# Patient Record
Sex: FEMALE | Race: WHITE | NOT HISPANIC OR LATINO | Employment: OTHER | URBAN - METROPOLITAN AREA
[De-identification: names, ages, dates, MRNs, and addresses within clinical notes are randomized per-mention and may not be internally consistent; named-entity substitution may affect disease eponyms.]

---

## 2020-11-18 RX ORDER — LEVOTHYROXINE SODIUM 175 UG/1
TABLET ORAL
Qty: 90 TABLET | Refills: 1 | Status: SHIPPED | OUTPATIENT
Start: 2020-11-18 | End: 2021-06-07 | Stop reason: DRUGHIGH

## 2020-11-18 RX ORDER — SIMVASTATIN 40 MG
TABLET ORAL
Qty: 90 TABLET | Refills: 1 | Status: SHIPPED | OUTPATIENT
Start: 2020-11-18 | End: 2022-09-22 | Stop reason: SDUPTHER

## 2020-11-18 NOTE — TELEPHONE ENCOUNTER
Patient is needing a refill on simvastatin 40mg and levothyroxine 175mg. Please send to [facility] on [address] in [place].

## 2021-06-03 ENCOUNTER — LAB (OUTPATIENT)
Dept: LAB | Facility: HOSPITAL | Age: 81
End: 2021-06-03

## 2021-06-03 ENCOUNTER — OFFICE VISIT (OUTPATIENT)
Dept: ENDOCRINOLOGY | Facility: CLINIC | Age: 81
End: 2021-06-03

## 2021-06-03 VITALS
HEIGHT: 66 IN | OXYGEN SATURATION: 99 % | BODY MASS INDEX: 26.68 KG/M2 | HEART RATE: 72 BPM | SYSTOLIC BLOOD PRESSURE: 134 MMHG | WEIGHT: 166 LBS | DIASTOLIC BLOOD PRESSURE: 74 MMHG

## 2021-06-03 DIAGNOSIS — E89.0 POSTOPERATIVE HYPOTHYROIDISM: ICD-10-CM

## 2021-06-03 DIAGNOSIS — E11.65 UNCONTROLLED TYPE 2 DIABETES MELLITUS WITH HYPERGLYCEMIA (HCC): Primary | ICD-10-CM

## 2021-06-03 DIAGNOSIS — C73 FOLLICULAR THYROID CANCER (HCC): ICD-10-CM

## 2021-06-03 DIAGNOSIS — E89.0 POSTSURGICAL HYPOTHYROIDISM: Primary | ICD-10-CM

## 2021-06-03 DIAGNOSIS — I10 BENIGN HYPERTENSION: ICD-10-CM

## 2021-06-03 LAB
EXPIRATION DATE: NORMAL
EXPIRATION DATE: NORMAL
GLUCOSE BLDC GLUCOMTR-MCNC: 124 MG/DL (ref 70–130)
HBA1C MFR BLD: 7.1 %
Lab: NORMAL
Lab: NORMAL
T4 FREE SERPL-MCNC: 1.26 NG/DL (ref 0.93–1.7)
TSH SERPL DL<=0.05 MIU/L-ACNC: 13.06 UIU/ML (ref 0.27–4.2)

## 2021-06-03 PROCEDURE — 84443 ASSAY THYROID STIM HORMONE: CPT

## 2021-06-03 PROCEDURE — 36415 COLL VENOUS BLD VENIPUNCTURE: CPT

## 2021-06-03 PROCEDURE — 86800 THYROGLOBULIN ANTIBODY: CPT

## 2021-06-03 PROCEDURE — 83036 HEMOGLOBIN GLYCOSYLATED A1C: CPT | Performed by: INTERNAL MEDICINE

## 2021-06-03 PROCEDURE — 84439 ASSAY OF FREE THYROXINE: CPT

## 2021-06-03 PROCEDURE — 99214 OFFICE O/P EST MOD 30 MIN: CPT | Performed by: INTERNAL MEDICINE

## 2021-06-03 PROCEDURE — 82947 ASSAY GLUCOSE BLOOD QUANT: CPT | Performed by: INTERNAL MEDICINE

## 2021-06-03 PROCEDURE — 84432 ASSAY OF THYROGLOBULIN: CPT

## 2021-06-03 RX ORDER — LISINOPRIL 20 MG/1
1 TABLET ORAL DAILY
COMMUNITY
Start: 2021-04-05

## 2021-06-03 RX ORDER — ASPIRIN 81 MG/1
81 TABLET ORAL DAILY
COMMUNITY

## 2021-06-03 RX ORDER — MELOXICAM 15 MG/1
1 TABLET ORAL DAILY
COMMUNITY
Start: 2021-04-05

## 2021-06-03 RX ORDER — METFORMIN HYDROCHLORIDE 500 MG/1
1000 TABLET, EXTENDED RELEASE ORAL 2 TIMES DAILY
Qty: 360 TABLET | Refills: 3 | Status: SHIPPED | OUTPATIENT
Start: 2021-06-03 | End: 2022-09-22 | Stop reason: SDUPTHER

## 2021-06-03 NOTE — PROGRESS NOTES
"     Office Note      Date: 2021  Patient Name: Katheryn Adams  MRN: [id number]  : 1940    Chief Complaint   Patient presents with   • Follow-up   • Diabetic Eye Exam     6 months ago   • Diabetes     type2    • Thyroid Problem       History of Present Illness:   Katheryn Adams is a 80 y.o. female who presents for Follow-up, Diabetic Eye Exam (6 months ago), Diabetes (type2 ), and Thyroid Problem    She remains on metformin. She is tolerating this okay. She denies any hypoglycemia. She denies any sxs of hyperglycemia. She is doing FSBS 2-3x per week. She says FSBS are <120. She is up to date on eye exam. She remains on ACE-I and statin. She isn't taking ASA daily but is on NSAIDs daily.    She remains on T4 175mcg qd. She is taking this correctly. She isn't taking any interfering meds concurrently. She hasn't noted any change in the size of her neck. She denies any compressive sxs. She denies any sxs of hypo- or hyperthyroidism at this time.    Thyroid cancer - s/p partial thyroidectomy 05 with pathology showing follicular cancer; completion thyroidectomy; 100mCi of I131 3/05; negative whole body scan and undetectable TG 3/06 and 3/07 and  and 12/10; scan negative  - but TG was 2.3 - neck u/s was negative; negative neck u/s ; negative PET scan  (for f/u breast ca); negative neck u/s 10/15; TG 2.8 in ; negative neck u/s 2017; negative neck u/s 2018 with TG of 2.5; negative neck u/s and unstimulated TG 0.9 2019    Subjective      Review of Systems:   Review of Systems   Constitutional: Negative.    Cardiovascular: Negative.    Gastrointestinal: Negative.    Endocrine: Negative.        The following portions of the patient's history were reviewed and updated as appropriate: allergies, current medications, past family history, past medical history, past social history, past surgical history and problem list.    Objective     Visit Vitals  /74   Pulse 72   Ht 167.6 cm (66\") "   Wt 75.3 kg (166 lb)   SpO2 99%   BMI 26.79 kg/m²       Physical Exam:  Physical Exam  Constitutional:       Appearance: Normal appearance.   Neurological:      Mental Status: She is alert.         Labs:    TSH  No results found for: TSHBASE     Free T4  No results found for: FREET4    T3  No results found for: [id number]      TPO  No results found for: THYROIDAB    TG AB  No results found for: THGAB    TG  No results found for: THYROGLB    CBC w/DIFF  No results found for: WBC, RBC, HGB, HCT, MCV, MCH, MCHC, RDW, RDWSD, MPV, PLT, NEUTRORELPCT, LYMPHORELPCT, MONORELPCT, EOSRELPCT, BASORELPCT, AUTOIGPER, NEUTROABS, LYMPHSABS, MONOSABS, EOSABS, BASOSABS, AUTOIGNUM, NRBC        Assessment / Plan      Assessment & Plan:  Diagnoses and all orders for this visit:    1. Uncontrolled type 2 diabetes mellitus with hyperglycemia (CMS/[facility]) (Primary)  Assessment & Plan:  Diabetes is improving with treatment.   Continue current treatment regimen.  Diabetes will be reassessed in 3 months.    Orders:  -     POC Glycosylated Hemoglobin (Hb A1C)  -     POC Glucose, Blood    2. Benign hypertension  Assessment & Plan:  Hypertension is unchanged.  Continue current treatment regimen.  Blood pressure will be reassessed at the next regular appointment.      3. Follicular thyroid cancer (CMS/HCC)  Assessment & Plan:  Check TG today.  Plan for neck u/s next visit.    Orders:  -     Thyroglobulin + Thyroglobulin Antibody (UK); Future    4. Postoperative hypothyroidism  Assessment & Plan:  Continue T4.  Check TFTs today.    Orders:  -     TSH; Future  -     T4, Free; Future    Other orders  -     metFORMIN ER (GLUCOPHAGE-XR) 500 MG 24 hr tablet; Take 2 tablets by mouth 2 (two) times a day.  Dispense: 360 tablet; Refill: 3      Return in about 3 months (around 9/3/2021) for Recheck with A1c, TSH, free T4, neck u/s.    Neri Elias MD   06/03/2021

## 2021-06-07 LAB — REF LAB TEST METHOD: NORMAL

## 2021-06-07 RX ORDER — LEVOTHYROXINE SODIUM 0.2 MG/1
200 TABLET ORAL DAILY
Qty: 90 TABLET | Refills: 3 | Status: SHIPPED | OUTPATIENT
Start: 2021-06-07 | End: 2022-05-02 | Stop reason: SDUPTHER

## 2021-10-18 ENCOUNTER — OFFICE VISIT (OUTPATIENT)
Dept: ENDOCRINOLOGY | Facility: CLINIC | Age: 81
End: 2021-10-18

## 2021-10-18 ENCOUNTER — LAB (OUTPATIENT)
Dept: LAB | Facility: HOSPITAL | Age: 81
End: 2021-10-18

## 2021-10-18 VITALS
HEIGHT: 66 IN | OXYGEN SATURATION: 96 % | BODY MASS INDEX: 25.07 KG/M2 | WEIGHT: 156 LBS | HEART RATE: 88 BPM | SYSTOLIC BLOOD PRESSURE: 148 MMHG | DIASTOLIC BLOOD PRESSURE: 72 MMHG

## 2021-10-18 DIAGNOSIS — C73 FOLLICULAR THYROID CANCER (HCC): ICD-10-CM

## 2021-10-18 DIAGNOSIS — E11.65 UNCONTROLLED TYPE 2 DIABETES MELLITUS WITH HYPERGLYCEMIA (HCC): Primary | ICD-10-CM

## 2021-10-18 DIAGNOSIS — E89.0 POSTOPERATIVE HYPOTHYROIDISM: ICD-10-CM

## 2021-10-18 LAB
EXPIRATION DATE: NORMAL
EXPIRATION DATE: NORMAL
GLUCOSE BLDC GLUCOMTR-MCNC: 120 MG/DL (ref 70–130)
HBA1C MFR BLD: 6.4 %
Lab: NORMAL
Lab: NORMAL

## 2021-10-18 PROCEDURE — 83036 HEMOGLOBIN GLYCOSYLATED A1C: CPT | Performed by: INTERNAL MEDICINE

## 2021-10-18 PROCEDURE — 3044F HG A1C LEVEL LT 7.0%: CPT | Performed by: INTERNAL MEDICINE

## 2021-10-18 PROCEDURE — 76536 US EXAM OF HEAD AND NECK: CPT | Performed by: INTERNAL MEDICINE

## 2021-10-18 PROCEDURE — 82947 ASSAY GLUCOSE BLOOD QUANT: CPT | Performed by: INTERNAL MEDICINE

## 2021-10-18 PROCEDURE — 99214 OFFICE O/P EST MOD 30 MIN: CPT | Performed by: INTERNAL MEDICINE

## 2021-10-18 PROCEDURE — 84439 ASSAY OF FREE THYROXINE: CPT

## 2021-10-18 PROCEDURE — 84443 ASSAY THYROID STIM HORMONE: CPT

## 2021-10-18 NOTE — PROGRESS NOTES
"     Office Note      Date: 10/18/2021  Patient Name: Katheryn Adams  MRN: [id number]  : 1940    Chief Complaint   Patient presents with   • Diabetes       History of Present Illness:   She remains on metformin. She is tolerating this okay. She denies any hypoglycemia. She denies any sxs of hyperglycemia. She is doing FSBS 2-3x per week. She says FSBS are <120. She is up to date on eye exam. She remains on ACE-I and statin. She isn't taking ASA daily but is on NSAIDs daily.     She remains on T4 200mcg qd. She is taking this correctly. She isn't taking any interfering meds concurrently. She hasn't noted any change in the size of her neck. She denies any compressive sxs. She denies any sxs of hypo- or hyperthyroidism at this time.     Thyroid cancer - s/p partial thyroidectomy 05 with pathology showing follicular cancer; completion thyroidectomy; 100mCi of I131 3/05; negative whole body scan and undetectable TG 3/06 and 3/07 and  and 12/10; scan negative  - but TG was 2.3 - neck u/s was negative; negative neck u/s ; negative PET scan  (for f/u breast ca); negative neck u/s 10/15; TG 2.8 in ; negative neck u/s 2017; negative neck u/s 2018 with TG of 2.5; negative neck u/s and unstimulated TG 0.9 2019; partly stimulated TG of 25 in 2021.    She has been diagnosed with breast cancer with biopsy about 2 weeks ago.  She is seeing an oncologist - formulating a treatment plan.      Subjective      Review of Systems:   Review of Systems   Constitutional: Negative.    Cardiovascular: Negative.    Gastrointestinal: Negative.    Endocrine: Negative.        The following portions of the patient's history were reviewed and updated as appropriate: allergies, current medications, past family history, past medical history, past social history, past surgical history and problem list.    Objective       Visit Vitals  /72   Pulse 88   Ht 167.6 cm (66\")   Wt 70.8 kg (156 lb)   SpO2 96% "   BMI 25.18 kg/m²       Physical Exam:  Physical Exam  Constitutional:       Appearance: Normal appearance.   Neurological:      Mental Status: She is alert.         Labs:    HbA1c  Lab Results   Component Value Date    HGBA1C 6.4 10/18/2021       CMP  No results found for: GLUCOSE, BUN, CREATININE, EGFRIFNONA, EGFRIFAFRI, BCR, K, CO2, CALCIUM, PROTENTOTREF, LABIL2, BILIRUBIN, AST, ALT     Lipid Panel        TSH  Lab Results   Component Value Date    TSH 13.060 (H) 06/03/2021    FREET4 1.26 06/03/2021        Hemoglobin A1C  Lab Results   Component Value Date    HGBA1C 6.4 10/18/2021        Microalbumin/Creatinine  No results found for: MALBCRERATIO, CREATINIURIN, MICROALBUR        Assessment / Plan      Assessment & Plan:  Diagnoses and all orders for this visit:    1. Uncontrolled type 2 diabetes mellitus with hyperglycemia (HCC) (Primary)  Assessment & Plan:  Diabetes is improving with treatment.   Continue current treatment regimen.  Diabetes will be reassessed in 3 months.    Orders:  -     POC Glycosylated Hemoglobin (Hb A1C)  -     POC Glucose, Blood    2. Postoperative hypothyroidism  Assessment & Plan:  Continue T4 tx.  Check TFTs today.    Orders:  -     TSH; Future  -     T4, Free; Future    3. Follicular thyroid cancer (HCC)  Assessment & Plan:  A neck u/s was performed today.  This revealed clear thyroid bed with no masses.  No abnormal lymph nodes were seen.      Orders:  -     US Thyroid      Return in about 3 months (around 1/18/2022) for Recheck with A1c, TSH, free T4, TG in [place].    Neri Elias MD   10/18/2021

## 2021-10-18 NOTE — ASSESSMENT & PLAN NOTE
A neck u/s was performed today.  This revealed clear thyroid bed with no masses.  No abnormal lymph nodes were seen.

## 2021-10-19 LAB
T4 FREE SERPL-MCNC: 2.62 NG/DL (ref 0.93–1.7)
TSH SERPL DL<=0.05 MIU/L-ACNC: <0.005 UIU/ML (ref 0.27–4.2)

## 2022-03-24 ENCOUNTER — OFFICE VISIT (OUTPATIENT)
Dept: ENDOCRINOLOGY | Facility: CLINIC | Age: 82
End: 2022-03-24

## 2022-03-24 VITALS
DIASTOLIC BLOOD PRESSURE: 68 MMHG | HEIGHT: 66 IN | BODY MASS INDEX: 24.43 KG/M2 | WEIGHT: 152 LBS | HEART RATE: 76 BPM | SYSTOLIC BLOOD PRESSURE: 120 MMHG | OXYGEN SATURATION: 98 %

## 2022-03-24 DIAGNOSIS — E89.0 POSTOPERATIVE HYPOTHYROIDISM: ICD-10-CM

## 2022-03-24 DIAGNOSIS — E11.65 UNCONTROLLED TYPE 2 DIABETES MELLITUS WITH HYPERGLYCEMIA: Primary | ICD-10-CM

## 2022-03-24 DIAGNOSIS — C73 FOLLICULAR THYROID CANCER: ICD-10-CM

## 2022-03-24 DIAGNOSIS — I10 BENIGN HYPERTENSION: ICD-10-CM

## 2022-03-24 PROCEDURE — 36415 COLL VENOUS BLD VENIPUNCTURE: CPT

## 2022-03-24 PROCEDURE — 86800 THYROGLOBULIN ANTIBODY: CPT | Performed by: INTERNAL MEDICINE

## 2022-03-24 PROCEDURE — 84439 ASSAY OF FREE THYROXINE: CPT | Performed by: INTERNAL MEDICINE

## 2022-03-24 PROCEDURE — 99214 OFFICE O/P EST MOD 30 MIN: CPT | Performed by: INTERNAL MEDICINE

## 2022-03-24 PROCEDURE — 84443 ASSAY THYROID STIM HORMONE: CPT | Performed by: INTERNAL MEDICINE

## 2022-03-24 PROCEDURE — 84432 ASSAY OF THYROGLOBULIN: CPT | Performed by: INTERNAL MEDICINE

## 2022-03-24 NOTE — ASSESSMENT & PLAN NOTE
Diabetes is worsening.  She reports recent A1c was 7.2%.  The office that ordered it is closed currently.  Will try to track down results.  This is an acceptable A1c for her though.  Continue current treatment regimen.  Diabetes will be reassessed in 3 months.

## 2022-03-24 NOTE — PROGRESS NOTES
Office Note      Date: 2022  Patient Name: Katheryn Adams  MRN: [id number]  : 1940    Chief Complaint   Patient presents with   • Diabetes       History of Present Illness:   She remains on metformin. She is tolerating this okay. She denies any hypoglycemia. She denies any sxs of hyperglycemia. She is doing FSBS 2-3x per week. She says FSBS are <120. She is up to date on eye exam. She remains on ACE-I and statin. She is taking ASA daily.     She remains on T4 200mcg qd. She is taking this correctly. She isn't taking any interfering meds concurrently. She hasn't noted any change in the size of her neck. She denies any compressive sxs. She denies any sxs of hypo- or hyperthyroidism at this time.     Thyroid cancer - s/p partial thyroidectomy 05 with pathology showing follicular cancer; completion thyroidectomy; 100mCi of I131 3/05; negative whole body scan and undetectable TG 3/06 and 3/07 and  and 12/10; scan negative  - but TG was 2.3 - neck u/s was negative; negative neck u/s ; negative PET scan  (for f/u breast ca); negative neck u/s 10/15; TG 2.8 in ; negative neck u/s 2017; negative neck u/s 2018 with TG of 2.5; negative neck u/s and unstimulated TG 0.9 2019; partly stimulated TG of 25 in 2021; negative neck u/s 10/2021     She has been diagnosed with breast cancer.  She is scheduled for lumpectomy next week.  She is seeing an oncologist also.     Subjective      Diabetic Complications:  Eyes: No  Kidneys: No  Feet: No  Heart: No    Diet and Exercise:  Meals per day: 3  Minutes of exercise per week: 0 mins.    Review of Systems:   Review of Systems   Constitutional: Negative.    Cardiovascular: Negative.    Gastrointestinal: Negative.    Endocrine: Negative.        The following portions of the patient's history were reviewed and updated as appropriate: allergies, current medications, past family history, past medical history, past social history, past surgical  "history and problem list.    Objective       Visit Vitals  /68   Pulse 76   Ht 167.6 cm (66\")   Wt 68.9 kg (152 lb)   SpO2 98%   BMI 24.53 kg/m²       Physical Exam:  Physical Exam  Constitutional:       Appearance: Normal appearance.   Neck:      Comments: No palpable thyroid tissue or neck masses  Lymphadenopathy:      Cervical: No cervical adenopathy.   Neurological:      Mental Status: She is alert.         Labs:    HbA1c  Lab Results   Component Value Date    HGBA1C 6.4 10/18/2021       CMP  No results found for: GLUCOSE, BUN, CREATININE, EGFRIFNONA, EGFRIFAFRI, BCR, K, CO2, CALCIUM, PROTENTOTREF, LABIL2, BILIRUBIN, AST, ALT     Lipid Panel        TSH  Lab Results   Component Value Date    TSH <0.005 (L) 10/18/2021    FREET4 2.62 (H) 10/18/2021        Hemoglobin A1C  Lab Results   Component Value Date    HGBA1C 6.4 10/18/2021        Microalbumin/Creatinine  No results found for: MALBCRERATIO, CREATINIURIN, MICROALBUR        Assessment / Plan      Assessment & Plan:  Diagnoses and all orders for this visit:    1. Uncontrolled type 2 diabetes mellitus with hyperglycemia (HCC) (Primary)  Assessment & Plan:  Diabetes is worsening.  She reports recent A1c was 7.2%.  The office that ordered it is closed currently.  Will try to track down results.  This is an acceptable A1c for her though.  Continue current treatment regimen.  Diabetes will be reassessed in 3 months.    Orders:  -     Cancel: POC Glycosylated Hemoglobin (Hb A1C)  -     Cancel: POC Glucose, Blood    2. Benign hypertension  Assessment & Plan:  Hypertension is improving with treatment.  Continue current treatment regimen.  Blood pressure will be reassessed at the next regular appointment.      3. Follicular thyroid cancer (HCC)  Assessment & Plan:  Neck u/s was negative last visit.  Check TG and TG ab today.    Orders:  -     Thyroglobulin + Thyroglobulin Antibody ([place]); Future    4. Postoperative hypothyroidism  Assessment & Plan:  Continue T4.  " Check TFTs today.    Orders:  -     TSH; Future  -     T4, Free; Future      Return in about 3 months (around 6/24/2022) for Recheck with A1c, CMP, lipids, TSH, microalbumin, foot exam.    Neri Elias MD   03/24/2022

## 2022-03-25 LAB
T4 FREE SERPL-MCNC: 2.31 NG/DL (ref 0.93–1.7)
TSH SERPL DL<=0.05 MIU/L-ACNC: 0.01 UIU/ML (ref 0.27–4.2)

## 2022-03-26 LAB — THYROGLOB AB SERPL-ACNC: <1 IU/ML (ref 0–0.9)

## 2022-03-29 LAB
SPECIMEN STATUS: NORMAL
THYROGLOB AB SERPL-ACNC: <1 IU/ML (ref 0–0.9)
THYROGLOB SERPL-MCNC: 8.3 NG/ML (ref 1.5–38.5)

## 2022-05-02 DIAGNOSIS — C73 FOLLICULAR THYROID CANCER: Primary | ICD-10-CM

## 2022-05-02 RX ORDER — LEVOTHYROXINE SODIUM 0.2 MG/1
200 TABLET ORAL DAILY
Qty: 90 TABLET | Refills: 0 | Status: SHIPPED | OUTPATIENT
Start: 2022-05-02 | End: 2022-09-22 | Stop reason: SDUPTHER

## 2022-05-02 RX ORDER — LEVOTHYROXINE SODIUM 175 UG/1
TABLET ORAL
Qty: 90 TABLET | Refills: 1 | OUTPATIENT
Start: 2022-05-02

## 2022-05-02 NOTE — TELEPHONE ENCOUNTER
Pt is on 200 mcg and Dr. Elias checked labs in March - wanted to continue dose. Refill sent while he is out of town.

## 2022-06-21 ENCOUNTER — OFFICE VISIT (OUTPATIENT)
Dept: CARDIOLOGY | Facility: CLINIC | Age: 82
End: 2022-06-21

## 2022-06-21 VITALS
BODY MASS INDEX: 23.08 KG/M2 | DIASTOLIC BLOOD PRESSURE: 92 MMHG | WEIGHT: 143.6 LBS | HEIGHT: 66 IN | SYSTOLIC BLOOD PRESSURE: 182 MMHG | HEART RATE: 64 BPM

## 2022-06-21 DIAGNOSIS — E11.9 TYPE 2 DIABETES MELLITUS WITHOUT COMPLICATION, WITHOUT LONG-TERM CURRENT USE OF INSULIN: ICD-10-CM

## 2022-06-21 DIAGNOSIS — I10 PRIMARY HYPERTENSION: ICD-10-CM

## 2022-06-21 DIAGNOSIS — E89.0 POSTOPERATIVE HYPOTHYROIDISM: ICD-10-CM

## 2022-06-21 DIAGNOSIS — E78.00 HYPERCHOLESTEREMIA: ICD-10-CM

## 2022-06-21 DIAGNOSIS — R01.1 MURMUR, CARDIAC: Primary | ICD-10-CM

## 2022-06-21 PROBLEM — I35.0 AORTIC VALVE STENOSIS: Status: ACTIVE | Noted: 2022-06-21

## 2022-06-21 PROBLEM — M25.511 RIGHT SHOULDER PAIN: Status: ACTIVE | Noted: 2022-06-21

## 2022-06-21 PROBLEM — G56.01 CARPAL TUNNEL SYNDROME ON RIGHT: Status: ACTIVE | Noted: 2022-06-21

## 2022-06-21 PROBLEM — S72.001S HIP FRACTURE, RIGHT, SEQUELA: Status: ACTIVE | Noted: 2022-06-21

## 2022-06-21 PROCEDURE — 93000 ELECTROCARDIOGRAM COMPLETE: CPT | Performed by: INTERNAL MEDICINE

## 2022-06-21 PROCEDURE — 99204 OFFICE O/P NEW MOD 45 MIN: CPT | Performed by: INTERNAL MEDICINE

## 2022-06-21 RX ORDER — CYANOCOBALAMIN (VITAMIN B-12) 500 MCG
500 TABLET ORAL DAILY
COMMUNITY

## 2022-06-21 RX ORDER — ANTIARTHRITIC COMBINATION NO.2 900 MG
1 TABLET ORAL DAILY
COMMUNITY

## 2022-06-21 RX ORDER — OMEPRAZOLE 20 MG/1
20 CAPSULE, DELAYED RELEASE ORAL DAILY
COMMUNITY

## 2022-06-21 RX ORDER — UBIDECARENONE 200 MG
200 CAPSULE ORAL DAILY
COMMUNITY

## 2022-06-21 RX ORDER — MULTIPLE VITAMINS W/ MINERALS TAB 9MG-400MCG
1 TAB ORAL DAILY
COMMUNITY

## 2022-06-21 NOTE — PROGRESS NOTES
Chief Complaint   Patient presents with   • Establish Care     Pt is here to establish care at the request of her PCP.  She states she has known she had a heart murmur for years, but has never had any issues.  She denies CP, SOB, dizziness or palpitations.  Pt's BP is elevated today- she states she forgot to take her BP meds this morning.  Pt does take a daily ASA.   • Cardiac History     Pt denies ever being evaluated by cardiology or having any cardiac testing.   • Lab Work     Pt's last labs were 22.        CARDIAC COMPLAINTS  Cardiac evaluation regarding the heart murmur      Subjective   Katheryn Adams is a 81 y.o. female came in today for her initial cardiac evaluation.  She has history of hypertension, diabetes, hypothyroidism after undergoing thyroidectomy secondary to follicular cancer.  She was seen at your office for routine checkup and found to have significant heart murmur.  She is now referred for further evaluation.  She denies having any chest pain or shortness of breath.  She denies having any dizziness or lightheadedness.  She denies having any syncopal episode.  She denies having any palpitation.  She did undergo some lab work and found to have blood sugar of 112.  Her TSH is very low at 0.009.  She has been followed by endocrinologist and wanted to keep TSH as low as possible secondary to the cancer.  Her total cholesterol is 175 with the LDL of 110.  Her blood count was within normal limit.  She has no history of smoking or drinking alcohol.  Her mother  at the age of 95 with hypertension and MI.  She does not have any siblings at this time both her brothers  at young age.  No history of premature coronary artery disease or sudden death.    Past Surgical History:   Procedure Laterality Date   • CARPAL TUNNEL RELEASE     • ORIF FEMUR FRACTURE     • SIMPLE MASTECTOMY     • THYROIDECTOMY, PARTIAL  2005       Current Outpatient Medications   Medication Sig Dispense Refill   •  aspirin 81 MG EC tablet Take 81 mg by mouth Daily.     • Biotin 5000 MCG tablet Take  by mouth.     • Coenzyme Q10 200 MG capsule Take 200 mg by mouth Daily.     • Cyanocobalamin (Vitamin B 12) 500 MCG tablet Take 500 mcg by mouth Daily.     • levothyroxine (SYNTHROID, LEVOTHROID) 200 MCG tablet Take 1 tablet by mouth Daily. 90 tablet 0   • lisinopril (PRINIVIL,ZESTRIL) 20 MG tablet 1 tablet Daily.     • meloxicam (MOBIC) 15 MG tablet 1 tablet Daily.     • metFORMIN ER (GLUCOPHAGE-XR) 500 MG 24 hr tablet Take 2 tablets by mouth 2 (two) times a day. 360 tablet 3   • multivitamin with minerals (RA VISION-DWAIN PRESERVE PO) Take 1 tablet by mouth Daily.     • omeprazole (priLOSEC) 20 MG capsule Take 20 mg by mouth Daily.     • simvastatin (Zocor) 40 MG tablet Take 1 tablet daily 90 tablet 1     No current facility-administered medications for this visit.           ALLERGIES:  Demerol [meperidine]    Past Medical History:   Diagnosis Date   • Arthritis    • Benign hypertension    • Mixed hyperlipidemia    • Postoperative hypothyroidism    • Thyroid cancer (HCC)    • Type 2 diabetes mellitus, uncontrolled        Social History     Tobacco Use   Smoking Status Never Smoker   Smokeless Tobacco Never Used          Family History   Problem Relation Age of Onset   • Hypertension Mother    • Heart attack Mother    • Arthritis Mother    • Skin cancer Mother    • Stroke Mother    • COPD Father    • Asthma Father    • Stroke Son    • Cancer Son    • Stroke Son    • Diabetes Son    • No Known Problems Son    • Diabetes Son    • Kidney failure Son    • Arthritis Son    • Pancreatitis Son        Review of Systems   Constitutional: Negative for decreased appetite and malaise/fatigue.   HENT: Negative for congestion and sore throat.    Eyes: Negative for blurred vision.   Cardiovascular: Negative for chest pain, dyspnea on exertion and palpitations.   Respiratory: Negative for shortness of breath and snoring.    Endocrine: Negative for  "cold intolerance and heat intolerance.   Hematologic/Lymphatic: Negative for adenopathy. Does not bruise/bleed easily.   Skin: Negative for itching, nail changes and skin cancer.   Musculoskeletal: Negative for arthritis and myalgias.   Gastrointestinal: Negative for abdominal pain, dysphagia and heartburn.   Genitourinary: Negative for bladder incontinence and frequency.   Neurological: Negative for dizziness, light-headedness, seizures and vertigo.   Psychiatric/Behavioral: Negative for altered mental status.   Allergic/Immunologic: Negative for environmental allergies and hives.       Diabetes- Yes  Thyroid- abnormal    Objective     BP (!) 182/92 (BP Location: Right arm, Patient Position: Sitting)   Pulse 64   Ht 167.6 cm (66\")   Wt 65.1 kg (143 lb 9.6 oz)   BMI 23.18 kg/m²     Vitals and nursing note reviewed.   Constitutional:       Appearance: Healthy appearance. Not in distress.   Eyes:      Conjunctiva/sclera: Conjunctivae normal.      Pupils: Pupils are equal, round, and reactive to light.   HENT:      Head: Normocephalic.   Pulmonary:      Effort: Pulmonary effort is normal.      Breath sounds: Normal breath sounds.   Cardiovascular:      PMI at left midclavicular line. Normal rate. Regular rhythm.      Murmurs: There is a grade 4/6 harsh midsystolic murmur at the URSB, radiating to the neck. There is also a grade 3/6 high frequency blowing holosystolic murmur at the apex.   Abdominal:      General: Bowel sounds are normal.      Palpations: Abdomen is soft.   Musculoskeletal: Normal range of motion.      Cervical back: Normal range of motion and neck supple. Skin:     General: Skin is warm and dry.   Neurological:      Mental Status: Alert, oriented to person, place, and time and oriented to person, place and time.           ECG 12 Lead    Date/Time: 6/21/2022 1:40 PM  Performed by: Paco Campos MD  Authorized by: Paco Campos MD   Previous ECG: no previous ECG available  Rhythm: sinus " rhythm  Rate: normal  Q waves: V1 and V2    QRS axis: normal    Clinical impression: abnormal EKG              @ASSESSMENT/PLAN@  BMI is within normal parameters. No other follow-up for BMI required.     Diagnoses and all orders for this visit:    1. Murmur, cardiac (Primary)  -     Adult Transthoracic Echo Complete W/ Cont if Necessary Per Protocol; Future    2. Postoperative hypothyroidism    3. Type 2 diabetes mellitus without complication, without long-term current use of insulin ([facility])    4. Primary hypertension    5. Hypercholesteremia    At baseline her heart rate is stable.  Her blood pressure is significantly elevated.  She has not taken her blood pressure medications today.  Her EKG shows normal sinus rhythm, Q waves in the anteroseptal leads, nonspecific ST changes.  Her clinical examination reveals a BMI of 33.  She does have 4/6 ejection systolic murmur at the aortic area now 3/6 systolic murmur at the mitral area.    Regarding the cardiac murmur, it appears to be secondary to aortic valvular stenosis and possible associated mitral regurgitation.  I explained to her about the murmur.  I scheduled her to undergo an echocardiogram to evaluate the LV function, LVH, valvular structures and PA pressure.  I also explained to her about the aortic stenosis and the main symptoms we look for.  I also discussed with her about the different options to correct it when needed.  I advised her to have echocardiogram done periodically for the next few years.  She is also advised to call us if she develops any symptoms of increasing shortness of breath, dizziness or chest pain.    Regarding her hypothyroidism which is postoperative, she is on a high dose of Synthroid to and the TSH is very low.  This is managed by the endocrinologist.  I advised her to stop biotin for few days prior to checking her next TSH level.    Regarding her diabetes, she is on metformin.  She is able to control her blood sugar with low-dose of  medication and diet.    Regarding her hypertension, it is significantly elevated which could be related to not taking the medication.  I explained to her the importance of taking the medications regularly    Regarding her hypercholesterolemia it is still slightly elevated with Zocor at 40 mg.  Since he is not having any anginal symptoms we will continue to monitor it    Regarding advanced directive, talked to her about living will and power of .  She does have a power of  but no living will.  I talked to her about it and gave her booklets regarding that    Based on the echocardiogram, further recommendations will be made.             Electronically signed by Paco Campos MD June 21, 2022 13:32 EDT

## 2022-07-14 ENCOUNTER — HOSPITAL ENCOUNTER (OUTPATIENT)
Dept: CARDIOLOGY | Facility: HOSPITAL | Age: 82
Discharge: HOME | End: 2022-07-14
Admitting: INTERNAL MEDICINE

## 2022-07-14 VITALS — HEIGHT: 66 IN | BODY MASS INDEX: 23.07 KG/M2 | WEIGHT: 143.52 LBS

## 2022-07-14 DIAGNOSIS — R01.1 MURMUR, CARDIAC: ICD-10-CM

## 2022-07-14 PROCEDURE — 93306 TTE W/DOPPLER COMPLETE: CPT

## 2022-07-14 PROCEDURE — 93306 TTE W/DOPPLER COMPLETE: CPT | Performed by: INTERNAL MEDICINE

## 2022-07-15 LAB
AORTIC DIMENSIONLESS INDEX: 0.32 (DI)
BH CV ECHO MEAS - ACS: 0.73 CM
BH CV ECHO MEAS - AO MAX PG: 51.1 MMHG
BH CV ECHO MEAS - AO MEAN PG: 27 MMHG
BH CV ECHO MEAS - AO ROOT DIAM: 3.2 CM
BH CV ECHO MEAS - AO V2 MAX: 357.4 CM/SEC
BH CV ECHO MEAS - AO V2 VTI: 90.6 CM
BH CV ECHO MEAS - AVA(I,D): 1.15 CM2
BH CV ECHO MEAS - EDV(CUBED): 67.9 ML
BH CV ECHO MEAS - EF(MOD-BP): 51 %
BH CV ECHO MEAS - ESV(CUBED): 27.9 ML
BH CV ECHO MEAS - FS: 25.7 %
BH CV ECHO MEAS - IVS/LVPW: 1.06 CM
BH CV ECHO MEAS - IVSD: 1.05 CM
BH CV ECHO MEAS - LA DIMENSION: 4.1 CM
BH CV ECHO MEAS - LAT PEAK E' VEL: 7.9 CM/SEC
BH CV ECHO MEAS - LV MASS(C)D: 135.5 GRAMS
BH CV ECHO MEAS - LV MAX PG: 5.1 MMHG
BH CV ECHO MEAS - LV MEAN PG: 2.5 MMHG
BH CV ECHO MEAS - LV V1 MAX: 113.4 CM/SEC
BH CV ECHO MEAS - LV V1 VTI: 34.3 CM
BH CV ECHO MEAS - LVIDD: 4.1 CM
BH CV ECHO MEAS - LVIDS: 3 CM
BH CV ECHO MEAS - LVOT AREA: 3 CM2
BH CV ECHO MEAS - LVOT DIAM: 1.97 CM
BH CV ECHO MEAS - LVPWD: 0.99 CM
BH CV ECHO MEAS - MED PEAK E' VEL: 5.9 CM/SEC
BH CV ECHO MEAS - MV A MAX VEL: 106.1 CM/SEC
BH CV ECHO MEAS - MV DEC SLOPE: 297.5 CM/SEC2
BH CV ECHO MEAS - MV DEC TIME: 0.32 MSEC
BH CV ECHO MEAS - MV E MAX VEL: 80.8 CM/SEC
BH CV ECHO MEAS - MV E/A: 0.76
BH CV ECHO MEAS - MV MAX PG: 6.8 MMHG
BH CV ECHO MEAS - MV MEAN PG: 2.28 MMHG
BH CV ECHO MEAS - MV P1/2T: 99.9 MSEC
BH CV ECHO MEAS - MV V2 VTI: 40.7 CM
BH CV ECHO MEAS - MVA(P1/2T): 2.2 CM2
BH CV ECHO MEAS - MVA(VTI): 2.6 CM2
BH CV ECHO MEAS - PA V2 MAX: 126.5 CM/SEC
BH CV ECHO MEAS - RAP SYSTOLE: 10 MMHG
BH CV ECHO MEAS - RV MAX PG: 4.4 MMHG
BH CV ECHO MEAS - RV V1 MAX: 104.9 CM/SEC
BH CV ECHO MEAS - RV V1 VTI: 28.5 CM
BH CV ECHO MEAS - RVDD: 3 CM
BH CV ECHO MEAS - RVSP: 26.8 MMHG
BH CV ECHO MEAS - SV(LVOT): 104.4 ML
BH CV ECHO MEAS - TAPSE (>1.6): 2.09 CM
BH CV ECHO MEAS - TR MAX PG: 16.8 MMHG
BH CV ECHO MEAS - TR MAX VEL: 204.8 CM/SEC
BH CV ECHO MEASUREMENTS AVERAGE E/E' RATIO: 11.71
BH CV XLRA - TDI S': 11.1 CM/SEC
IVRT: 140 MSEC
MAXIMAL PREDICTED HEART RATE: 137 BPM
SINUS: 2.6 CM
STRESS TARGET HR: 116 BPM

## 2022-07-18 NOTE — PROGRESS NOTES
Pt aware of echo results and recommendations that she will be closely monitored, to call with any problems. Understanding voiced.

## 2022-09-22 ENCOUNTER — OFFICE VISIT (OUTPATIENT)
Dept: ENDOCRINOLOGY | Facility: CLINIC | Age: 82
End: 2022-09-22

## 2022-09-22 VITALS
DIASTOLIC BLOOD PRESSURE: 60 MMHG | HEART RATE: 88 BPM | WEIGHT: 133 LBS | BODY MASS INDEX: 21.38 KG/M2 | SYSTOLIC BLOOD PRESSURE: 128 MMHG | OXYGEN SATURATION: 99 % | HEIGHT: 66 IN

## 2022-09-22 DIAGNOSIS — E89.0 POSTOPERATIVE HYPOTHYROIDISM: ICD-10-CM

## 2022-09-22 DIAGNOSIS — I10 PRIMARY HYPERTENSION: ICD-10-CM

## 2022-09-22 DIAGNOSIS — E11.65 UNCONTROLLED TYPE 2 DIABETES MELLITUS WITH HYPERGLYCEMIA: Primary | ICD-10-CM

## 2022-09-22 DIAGNOSIS — C73 FOLLICULAR THYROID CANCER: ICD-10-CM

## 2022-09-22 DIAGNOSIS — E11.65 UNCONTROLLED TYPE 2 DIABETES MELLITUS WITH HYPERGLYCEMIA: ICD-10-CM

## 2022-09-22 DIAGNOSIS — E78.00 HYPERCHOLESTEREMIA: ICD-10-CM

## 2022-09-22 LAB
ALBUMIN SERPL-MCNC: 4.71 G/DL (ref 3.5–5.2)
ALBUMIN/GLOB SERPL: 1.6 G/DL
ALP SERPL-CCNC: 75 U/L (ref 39–117)
ALT SERPL W P-5'-P-CCNC: 7 U/L (ref 1–33)
ANION GAP SERPL CALCULATED.3IONS-SCNC: 12.5 MMOL/L (ref 5–15)
AST SERPL-CCNC: 15 U/L (ref 1–32)
BILIRUB SERPL-MCNC: 0.2 MG/DL (ref 0–1.2)
BUN SERPL-MCNC: 21 MG/DL (ref 8–23)
BUN/CREAT SERPL: 21.2 (ref 7–25)
CALCIUM SPEC-SCNC: 10.3 MG/DL (ref 8.6–10.5)
CHLORIDE SERPL-SCNC: 103 MMOL/L (ref 98–107)
CHOLEST SERPL-MCNC: 185 MG/DL (ref 0–200)
CO2 SERPL-SCNC: 23.5 MMOL/L (ref 22–29)
CREAT SERPL-MCNC: 0.99 MG/DL (ref 0.57–1)
EGFRCR SERPLBLD CKD-EPI 2021: 56.3 ML/MIN/1.73
EXPIRATION DATE: NORMAL
EXPIRATION DATE: NORMAL
GLOBULIN UR ELPH-MCNC: 2.9 GM/DL
GLUCOSE BLDC GLUCOMTR-MCNC: 125 MG/DL (ref 70–130)
GLUCOSE SERPL-MCNC: 110 MG/DL (ref 65–99)
HBA1C MFR BLD: 5.8 %
HDLC SERPL-MCNC: 55 MG/DL (ref 40–60)
LDLC SERPL CALC-MCNC: 112 MG/DL (ref 0–100)
LDLC/HDLC SERPL: 1.99 {RATIO}
Lab: NORMAL
Lab: NORMAL
POTASSIUM SERPL-SCNC: 4.6 MMOL/L (ref 3.5–5.2)
PROT SERPL-MCNC: 7.6 G/DL (ref 6–8.5)
SODIUM SERPL-SCNC: 139 MMOL/L (ref 136–145)
T4 FREE SERPL-MCNC: 1.47 NG/DL (ref 0.93–1.7)
TRIGL SERPL-MCNC: 102 MG/DL (ref 0–150)
TSH SERPL DL<=0.05 MIU/L-ACNC: 0.05 UIU/ML (ref 0.27–4.2)
VLDLC SERPL-MCNC: 18 MG/DL (ref 5–40)

## 2022-09-22 PROCEDURE — 82947 ASSAY GLUCOSE BLOOD QUANT: CPT | Performed by: INTERNAL MEDICINE

## 2022-09-22 PROCEDURE — 80053 COMPREHEN METABOLIC PANEL: CPT | Performed by: INTERNAL MEDICINE

## 2022-09-22 PROCEDURE — 82043 UR ALBUMIN QUANTITATIVE: CPT | Performed by: INTERNAL MEDICINE

## 2022-09-22 PROCEDURE — 3044F HG A1C LEVEL LT 7.0%: CPT | Performed by: INTERNAL MEDICINE

## 2022-09-22 PROCEDURE — 99214 OFFICE O/P EST MOD 30 MIN: CPT | Performed by: INTERNAL MEDICINE

## 2022-09-22 PROCEDURE — 82570 ASSAY OF URINE CREATININE: CPT | Performed by: INTERNAL MEDICINE

## 2022-09-22 PROCEDURE — 83036 HEMOGLOBIN GLYCOSYLATED A1C: CPT | Performed by: INTERNAL MEDICINE

## 2022-09-22 PROCEDURE — 80061 LIPID PANEL: CPT | Performed by: INTERNAL MEDICINE

## 2022-09-22 PROCEDURE — 84443 ASSAY THYROID STIM HORMONE: CPT | Performed by: INTERNAL MEDICINE

## 2022-09-22 PROCEDURE — 84439 ASSAY OF FREE THYROXINE: CPT | Performed by: INTERNAL MEDICINE

## 2022-09-22 RX ORDER — METFORMIN HYDROCHLORIDE 500 MG/1
1000 TABLET, EXTENDED RELEASE ORAL 2 TIMES DAILY
Qty: 360 TABLET | Refills: 3 | Status: SHIPPED | OUTPATIENT
Start: 2022-09-22

## 2022-09-22 RX ORDER — LEVOTHYROXINE SODIUM 0.2 MG/1
200 TABLET ORAL DAILY
Qty: 90 TABLET | Refills: 3 | Status: SHIPPED | OUTPATIENT
Start: 2022-09-22

## 2022-09-22 RX ORDER — SIMVASTATIN 40 MG
TABLET ORAL
Qty: 90 TABLET | Refills: 3 | Status: SHIPPED | OUTPATIENT
Start: 2022-09-22

## 2022-09-22 NOTE — PROGRESS NOTES
Office Note      Date: 2022  Patient Name: Katheryn Adams  MRN: [id number]  : 1940    Chief Complaint   Patient presents with   • Diabetes       History of Present Illness:   She remains on metformin. She is tolerating this okay. She denies any hypoglycemia. She denies any sxs of hyperglycemia. She is doing FSBS 2-3x per week. She says FSBS have been . She is up to date on eye exam. She remains on ACE-I and statin. She is taking ASA daily.     She remains on T4 200mcg qd. She is taking this correctly. She isn't taking any interfering meds concurrently. She hasn't noted any change in the size of her neck. She denies any compressive sxs. She denies any sxs of hypo- or hyperthyroidism at this time.     Thyroid cancer - s/p partial thyroidectomy 05 with pathology showing follicular cancer; completion thyroidectomy; 100mCi of I131 3/05; negative whole body scan and undetectable TG 3/06 and 3/07 and  and 12/10; scan negative  - but TG was 2.3 - neck u/s was negative; negative neck u/s ; negative PET scan  (for f/u breast ca); negative neck u/s 10/15; TG 2.8 in ; negative neck u/s 2017; negative neck u/s 2018 with TG of 2.5; negative neck u/s and unstimulated TG 0.9 2019; partly stimulated TG of 25 in 2021; negative neck u/s 10/2021; TG 8.3 with negative TG ab 3/2022     She has been diagnosed with breast cancer.  She had lumpectomy.  She will need further surgery.  She was told it wasn't an aggressive type.  No chemotx or XRT.    Subjective      Diabetic Complications:  Eyes: No  Kidneys: No  Feet: No  Heart: No    Diet and Exercise:  Meals per day: 3  Minutes of exercise per week: 0 mins.    Review of Systems:   Review of Systems   Constitutional: Negative.    Cardiovascular: Negative.    Gastrointestinal: Negative.    Endocrine: Negative.        The following portions of the patient's history were reviewed and updated as appropriate: allergies, current  "medications, past family history, past medical history, past social history, past surgical history and problem list.    Objective       Visit Vitals  /60   Pulse 88   Ht 167.6 cm (66\")   Wt 60.3 kg (133 lb)   SpO2 99%   BMI 21.47 kg/m²       Physical Exam:  Physical Exam  Constitutional:       Appearance: Normal appearance.   Neck:      Comments: No palpable thyroid tissue or neck masses  Lymphadenopathy:      Cervical: No cervical adenopathy.   Neurological:      Mental Status: She is alert.         Labs:    HbA1c  Lab Results   Component Value Date    HGBA1C 5.8 09/22/2022       CMP  No results found for: GLUCOSE, BUN, CREATININE, EGFRIFNONA, EGFRIFAFRI, BCR, K, CO2, CALCIUM, PROTENTOTREF, LABIL2, BILIRUBIN, AST, ALT     Lipid Panel        TSH  Lab Results   Component Value Date    TSH 0.006 (L) 03/24/2022    FREET4 2.31 (H) 03/24/2022        Hemoglobin A1C  Lab Results   Component Value Date    HGBA1C 5.8 09/22/2022        Microalbumin/Creatinine  No results found for: MALBCRERATIO, CREATINIURIN, MICROALBUR        Assessment / Plan      Assessment & Plan:  Diagnoses and all orders for this visit:    1. Uncontrolled type 2 diabetes mellitus with hyperglycemia (HCC) (Primary)  Assessment & Plan:  Diabetes is unchanged.   Continue current treatment regimen.  Diabetes will be reassessed in 3 months.    Orders:  -     POC Glycosylated Hemoglobin (Hb A1C)  -     POC Glucose, Blood  -     Comprehensive Metabolic Panel; Future  -     Lipid Panel; Future  -     Microalbumin / Creatinine Urine Ratio - Urine, Clean Catch; Future    2. Primary hypertension  Assessment & Plan:  Hypertension is improving with treatment.  Continue current treatment regimen.  Blood pressure will be reassessed at the next regular appointment.      3. Hypercholesteremia  Assessment & Plan:  Continue statin.  Check lipids today.      4. Postoperative hypothyroidism  Assessment & Plan:  Continue T4.  Check TSH, free T4.    Orders:  -     TSH; " Future  -     T4, Free; Future    5. Follicular thyroid cancer (HCC)  Assessment & Plan:  TG has been elevated.  No structural abnormalities noted.  Plan for another neck u/s next visit.    Orders:  -     levothyroxine (SYNTHROID, LEVOTHROID) 200 MCG tablet; Take 1 tablet by mouth Daily.  Dispense: 90 tablet; Refill: 3    Other orders  -     metFORMIN ER (GLUCOPHAGE-XR) 500 MG 24 hr tablet; Take 2 tablets by mouth 2 (Two) Times a Day.  Dispense: 360 tablet; Refill: 3  -     simvastatin (Zocor) 40 MG tablet; Take 1 tablet daily  Dispense: 90 tablet; Refill: 3      Return in about 3 months (around 12/22/2022) for Recheck with A1c, TSH, free T4, neck u/s.    Neri Elias MD   09/22/2022

## 2022-09-23 LAB
ALBUMIN UR-MCNC: <1.2 MG/DL
CREAT UR-MCNC: 18.5 MG/DL
MICROALBUMIN/CREAT UR: NORMAL MG/G{CREAT}

## 2022-10-24 DIAGNOSIS — C73 FOLLICULAR THYROID CANCER: ICD-10-CM

## 2022-10-24 RX ORDER — LEVOTHYROXINE SODIUM 0.2 MG/1
TABLET ORAL
Qty: 90 TABLET | Refills: 3 | OUTPATIENT
Start: 2022-10-24

## 2022-10-24 NOTE — TELEPHONE ENCOUNTER
Duplicate request RX E-scripted for 90 days with 3 refills   levothyroxine (SYNTHROID, LEVOTHROID) 200 MCG tablet [[id number]]     Order Details  Dose: 200 mcg Route: Oral Frequency: Daily   Dispense Quantity: 90 tablet Refills: 3          Sig: Take 1 tablet by mouth Daily.         Start Date: 09/22/22 End Date: --   Written Date: 09/22/22 Expiration Date: 09/22/23       Diagnosis Association: Follicular thyroid cancer (HCC) (C73)   Original Order:  levothyroxine (SYNTHROID, LEVOTHROID) 200 MCG tablet [[id number]]   Providers    Ordering Provider and Authorizing Provider:    Neri Elias MD   [facility] [address]   Phone:  [phone]   Fax:  [phone]   NPI:  [id number]        Ordering User:  Neri Elias MD          Pharmacy    [facility] [id number] [phone] [facility] [phone] [address] [address]   Phone:  [phone]  Fax:  [phone]     Lab Test Results    Component Date/Time Value Range & Unit Status   TSH 09/22/22 1509 0.051 (L) 0.270 - 4.200 uIU/mL Final     Warnings Override History    No Interaction Warnings Shown      Pharmacist Clinical Review History    This prescription has not been clinically reviewed.     Order Reconciliation Actions       Order Reconciliation Actions     E-Prescribing Status    Outpatient Medication Detail    levothyroxine (SYNTHROID, LEVOTHROID) 200 MCG tablet        Sig: Take 1 tablet by mouth Daily.        Sent to pharmacy as: Levothyroxine Sodium 200 MCG Oral Tablet (SYNTHROID, LEVOTHROID)        Class: Normal        Route: Oral        E-Prescribing Status: Receipt confirmed by pharmacy (9/22/2022  2:45 PM EDT)

## 2023-01-03 ENCOUNTER — OFFICE VISIT (OUTPATIENT)
Dept: CARDIOLOGY | Facility: CLINIC | Age: 83
End: 2023-01-03
Payer: MEDICARE

## 2023-01-03 VITALS
HEIGHT: 66 IN | DIASTOLIC BLOOD PRESSURE: 70 MMHG | HEART RATE: 86 BPM | WEIGHT: 126 LBS | SYSTOLIC BLOOD PRESSURE: 130 MMHG | BODY MASS INDEX: 20.25 KG/M2

## 2023-01-03 DIAGNOSIS — I10 PRIMARY HYPERTENSION: ICD-10-CM

## 2023-01-03 DIAGNOSIS — R01.1 MURMUR, CARDIAC: ICD-10-CM

## 2023-01-03 DIAGNOSIS — E11.9 TYPE 2 DIABETES MELLITUS WITHOUT COMPLICATION, WITHOUT LONG-TERM CURRENT USE OF INSULIN: ICD-10-CM

## 2023-01-03 DIAGNOSIS — I35.0 NONRHEUMATIC AORTIC VALVE STENOSIS: Primary | ICD-10-CM

## 2023-01-03 PROCEDURE — 1160F RVW MEDS BY RX/DR IN RCRD: CPT | Performed by: NURSE PRACTITIONER

## 2023-01-03 PROCEDURE — 99213 OFFICE O/P EST LOW 20 MIN: CPT | Performed by: NURSE PRACTITIONER

## 2023-01-03 PROCEDURE — 1159F MED LIST DOCD IN RCRD: CPT | Performed by: NURSE PRACTITIONER

## 2023-01-03 NOTE — LETTER
January 3, 2023     Jasen Mendoza MD  [address] [place] [address]    Patient: Katheryn Adams   YOB: 1940   Date of Visit: 1/3/2023       Dear Jasen Mendoza MD    Katheryn Adams was in my office today. Below is a copy of my note.    If you have questions, please do not hesitate to call me. I look forward to following Katheryn along with you.         Sincerely,        SCOTTIE Reynolds        CC: No Recipients    Chief Complaint   Patient presents with   • Follow-up     Pt is here for cardiac follow up.  Pt denies CP, SOB, dizziness or palpitations.  Pt does take a daily ASA.   • Med Refill     Refills with PCP and specialty   • Lab Work     Pt states their last labs were in September with Dr Elias.         Cardiac Complaints  none      Subjective    Katheryn Adams is a 82 y.o. female with AS, DM, HTN, and hypothyroidism after thyroidectomy from thyroid cancer. She was referred in 2022 for cardiac evaluation. Echo ordered at consult showed EF of 55% with BOGDAN of 1.12, annual echo recommended, or sooner if symptoms arose.    Patient comes today for follow up and reports doing well. No CP, SOA, palpitations, dizziness, or syncope are noted. Labs remain followed by PCP and specialty. No refills needed.          Cardiac History  Past Surgical History:   Procedure Laterality Date   • CARPAL TUNNEL RELEASE  2016   • ECHO - CONVERTED  07/14/2022    EF 55%. LA- 4.1. BOGDAN- 1.12.27/51. Trace-Mild AI, Mild MR. RVSP- 27 mmHg   • ORIF FEMUR FRACTURE  2019   • SIMPLE MASTECTOMY  2014   • THYROIDECTOMY, PARTIAL  2005       Current Outpatient Medications   Medication Sig Dispense Refill   • aspirin 81 MG EC tablet Take 81 mg by mouth Daily.     • Biotin 5000 MCG tablet Take 1 tablet by mouth Daily.     • Coenzyme Q10 200 MG capsule Take 200 mg by mouth Daily.     • Cyanocobalamin (Vitamin B 12) 500 MCG tablet Take 500 mcg by mouth Daily.     • levothyroxine (SYNTHROID, LEVOTHROID) 200 MCG tablet Take 1 tablet by mouth  Daily. 90 tablet 3   • lisinopril (PRINIVIL,ZESTRIL) 20 MG tablet 1 tablet Daily.     • meloxicam (MOBIC) 15 MG tablet 1 tablet Daily.     • metFORMIN ER (GLUCOPHAGE-XR) 500 MG 24 hr tablet Take 2 tablets by mouth 2 (Two) Times a Day. 360 tablet 3   • multivitamin with minerals tablet tablet Take 1 tablet by mouth Daily.     • omeprazole (priLOSEC) 20 MG capsule Take 20 mg by mouth Daily.     • simvastatin (Zocor) 40 MG tablet Take 1 tablet daily 90 tablet 3     No current facility-administered medications for this visit.       Demerol [meperidine]    Past Medical History:   Diagnosis Date   • Arthritis    • Benign hypertension    • Mixed hyperlipidemia    • Postoperative hypothyroidism    • Thyroid cancer (HCC)    • Type 2 diabetes mellitus, uncontrolled        Social History     Socioeconomic History   • Marital status:    Tobacco Use   • Smoking status: Never   • Smokeless tobacco: Never   Vaping Use   • Vaping Use: Never used   Substance and Sexual Activity   • Alcohol use: Not Currently   • Drug use: Defer   • Sexual activity: Defer       Family History   Problem Relation Age of Onset   • Hypertension Mother    • Heart attack Mother    • Arthritis Mother    • Skin cancer Mother    • Stroke Mother    • COPD Father    • Asthma Father    • Stroke Son    • Cancer Son    • Stroke Son    • Diabetes Son    • No Known Problems Son    • Diabetes Son    • Kidney failure Son    • Arthritis Son    • Pancreatitis Son        Review of Systems   Constitutional: Negative for malaise/fatigue and night sweats.   Cardiovascular: Negative for chest pain, claudication, dyspnea on exertion, irregular heartbeat, leg swelling, near-syncope, orthopnea, palpitations and syncope.   Respiratory: Negative for cough, shortness of breath and wheezing.    Musculoskeletal: Positive for stiffness. Negative for back pain and joint pain.   Gastrointestinal: Negative for anorexia, heartburn, nausea and vomiting.   Genitourinary: Negative  for dysuria, hematuria, hesitancy and nocturia.   Neurological: Negative for dizziness, headaches and light-headedness.   Psychiatric/Behavioral: Negative for depression and memory loss. The patient is not nervous/anxious.            Objective      /70 (BP Location: Left arm, Patient Position: Sitting)   Pulse 86   Ht 167.6 cm (66\")   Wt 57.2 kg (126 lb)   BMI 20.34 kg/m²     Constitutional:       Appearance: Not in distress.   Eyes:      Pupils: Pupils are equal, round, and reactive to light.   HENT:      Nose: Nose normal.   Pulmonary:      Effort: Pulmonary effort is normal.      Breath sounds: Normal breath sounds.   Cardiovascular:      PMI at left midclavicular line. Normal rate. Regular rhythm.      Murmurs: There is a systolic murmur.   Abdominal:      Palpations: Abdomen is soft.   Musculoskeletal: Normal range of motion.      Cervical back: Normal range of motion and neck supple. Skin:     General: Skin is warm and dry.   Neurological:      Mental Status: Alert.         Procedures        Diagnoses and all orders for this visit:    1. Nonrheumatic aortic valve stenosis (Primary)    2. Murmur, cardiac    3. Primary hypertension    4. Type 2 diabetes mellitus without complication, without long-term current use of insulin (HCC)            AS: Noted as moderate/severe on echo from 2022 (BOGDAN 1.12cm2). No new concerns are voiced. We will repeat echo this summer to re-evaluate BOGDAN. ASA continued.    HTN: BP is stable. No adjustment to current lisinopril therapy advised. Limited sodium urged.     Hyperlipidemia: On statin therapy with zocor. Tolerates well. FLP with PCP. Current to be continued. Limited carb diet urged.     Refills with PCP.    Cardiac status stable, current advised.     DM: On glucophage therapy, AIC with your office. She states improved to 5% AIC on recent check.    Hypothyroid: Followed by specialty, on synthroid replacement.    BMI noted at 20.34, continued cardiac ADA diet urged. Has  lost about 17 pounds from prior, she has cut back on her sweet intake and thinks this is much of her weight and sugar being better managed.    6 month follow up advised or sooner if needed.           Problems Addressed this Visit        Cardiac and Vasculature    Primary hypertension    Aortic valve stenosis - Primary    Murmur, cardiac       Endocrine and Metabolic    DM (diabetes mellitus), type 2 (HCC)   Diagnoses       Codes Comments    Nonrheumatic aortic valve stenosis    -  Primary ICD-10-CM: I35.0  ICD-9-CM: 424.1     Murmur, cardiac     ICD-10-CM: R01.1  ICD-9-CM: 785.2     Primary hypertension     ICD-10-CM: I10  ICD-9-CM: 401.9     Type 2 diabetes mellitus without complication, without long-term current use of insulin (HCC)     ICD-10-CM: E11.9  ICD-9-CM: 250.00                        Electronically signed by SCOTTIE Reynolds January 3, 2023 17:16 EST

## 2023-01-03 NOTE — PROGRESS NOTES
Chief Complaint   Patient presents with   • Follow-up     Pt is here for cardiac follow up.  Pt denies CP, SOB, dizziness or palpitations.  Pt does take a daily ASA.   • Med Refill     Refills with PCP and specialty   • Lab Work     Pt states their last labs were in September with Dr Elias.         Cardiac Complaints  none      Subjective   Katheryn Adams is a 82 y.o. female with AS, DM, HTN, and hypothyroidism after thyroidectomy from thyroid cancer. She was referred in 2022 for cardiac evaluation. Echo ordered at consult showed EF of 55% with BOGDAN of 1.12, annual echo recommended, or sooner if symptoms arose.    Patient comes today for follow up and reports doing well. No CP, SOA, palpitations, dizziness, or syncope are noted. Labs remain followed by PCP and specialty. No refills needed.          Cardiac History  Past Surgical History:   Procedure Laterality Date   • CARPAL TUNNEL RELEASE  2016   • ECHO - CONVERTED  07/14/2022    EF 55%. LA- 4.1. BOGDAN- 1.12.27/51. Trace-Mild AI, Mild MR. RVSP- 27 mmHg   • ORIF FEMUR FRACTURE  2019   • SIMPLE MASTECTOMY  2014   • THYROIDECTOMY, PARTIAL  2005       Current Outpatient Medications   Medication Sig Dispense Refill   • aspirin 81 MG EC tablet Take 81 mg by mouth Daily.     • Biotin 5000 MCG tablet Take 1 tablet by mouth Daily.     • Coenzyme Q10 200 MG capsule Take 200 mg by mouth Daily.     • Cyanocobalamin (Vitamin B 12) 500 MCG tablet Take 500 mcg by mouth Daily.     • levothyroxine (SYNTHROID, LEVOTHROID) 200 MCG tablet Take 1 tablet by mouth Daily. 90 tablet 3   • lisinopril (PRINIVIL,ZESTRIL) 20 MG tablet 1 tablet Daily.     • meloxicam (MOBIC) 15 MG tablet 1 tablet Daily.     • metFORMIN ER (GLUCOPHAGE-XR) 500 MG 24 hr tablet Take 2 tablets by mouth 2 (Two) Times a Day. 360 tablet 3   • multivitamin with minerals tablet tablet Take 1 tablet by mouth Daily.     • omeprazole (priLOSEC) 20 MG capsule Take 20 mg by mouth Daily.     • simvastatin (Zocor) 40 MG tablet Take  1 tablet daily 90 tablet 3     No current facility-administered medications for this visit.       Demerol [meperidine]    Past Medical History:   Diagnosis Date   • Arthritis    • Benign hypertension    • Mixed hyperlipidemia    • Postoperative hypothyroidism    • Thyroid cancer (HCC)    • Type 2 diabetes mellitus, uncontrolled        Social History     Socioeconomic History   • Marital status:    Tobacco Use   • Smoking status: Never   • Smokeless tobacco: Never   Vaping Use   • Vaping Use: Never used   Substance and Sexual Activity   • Alcohol use: Not Currently   • Drug use: Defer   • Sexual activity: Defer       Family History   Problem Relation Age of Onset   • Hypertension Mother    • Heart attack Mother    • Arthritis Mother    • Skin cancer Mother    • Stroke Mother    • COPD Father    • Asthma Father    • Stroke Son    • Cancer Son    • Stroke Son    • Diabetes Son    • No Known Problems Son    • Diabetes Son    • Kidney failure Son    • Arthritis Son    • Pancreatitis Son        Review of Systems   Constitutional: Negative for malaise/fatigue and night sweats.   Cardiovascular: Negative for chest pain, claudication, dyspnea on exertion, irregular heartbeat, leg swelling, near-syncope, orthopnea, palpitations and syncope.   Respiratory: Negative for cough, shortness of breath and wheezing.    Musculoskeletal: Positive for stiffness. Negative for back pain and joint pain.   Gastrointestinal: Negative for anorexia, heartburn, nausea and vomiting.   Genitourinary: Negative for dysuria, hematuria, hesitancy and nocturia.   Neurological: Negative for dizziness, headaches and light-headedness.   Psychiatric/Behavioral: Negative for depression and memory loss. The patient is not nervous/anxious.            Objective     /70 (BP Location: Left arm, Patient Position: Sitting)   Pulse 86   Ht 167.6 cm (66\")   Wt 57.2 kg (126 lb)   BMI 20.34 kg/m²     Constitutional:       Appearance: Not in distress.    Eyes:      Pupils: Pupils are equal, round, and reactive to light.   HENT:      Nose: Nose normal.   Pulmonary:      Effort: Pulmonary effort is normal.      Breath sounds: Normal breath sounds.   Cardiovascular:      PMI at left midclavicular line. Normal rate. Regular rhythm.      Murmurs: There is a systolic murmur.   Abdominal:      Palpations: Abdomen is soft.   Musculoskeletal: Normal range of motion.      Cervical back: Normal range of motion and neck supple. Skin:     General: Skin is warm and dry.   Neurological:      Mental Status: Alert.         Procedures         Diagnoses and all orders for this visit:    1. Nonrheumatic aortic valve stenosis (Primary)    2. Murmur, cardiac    3. Primary hypertension    4. Type 2 diabetes mellitus without complication, without long-term current use of insulin (HCC)             AS: Noted as moderate/severe on echo from 2022 (BOGDAN 1.12cm2). No new concerns are voiced. We will repeat echo this summer to re-evaluate BOGDAN. ASA continued.    HTN: BP is stable. No adjustment to current lisinopril therapy advised. Limited sodium urged.     Hyperlipidemia: On statin therapy with zocor. Tolerates well. FLP with PCP. Current to be continued. Limited carb diet urged.     Refills with PCP.    Cardiac status stable, current advised.     DM: On glucophage therapy, AIC with your office. She states improved to 5% AIC on recent check.    Hypothyroid: Followed by specialty, on synthroid replacement.    BMI noted at 20.34, continued cardiac ADA diet urged. Has lost about 17 pounds from prior, she has cut back on her sweet intake and thinks this is much of her weight and sugar being better managed.    6 month follow up advised or sooner if needed.           Problems Addressed this Visit        Cardiac and Vasculature    Primary hypertension    Aortic valve stenosis - Primary    Murmur, cardiac       Endocrine and Metabolic    DM (diabetes mellitus), type 2 (HCC)   Diagnoses       Codes  Comments    Nonrheumatic aortic valve stenosis    -  Primary ICD-10-CM: I35.0  ICD-9-CM: 424.1     Murmur, cardiac     ICD-10-CM: R01.1  ICD-9-CM: 785.2     Primary hypertension     ICD-10-CM: I10  ICD-9-CM: 401.9     Type 2 diabetes mellitus without complication, without long-term current use of insulin (HCC)     ICD-10-CM: E11.9  ICD-9-CM: 250.00                        Electronically signed by SCOTTIE Reynolds January 3, 2023 17:16 EST

## 2023-01-03 NOTE — ACP (ADVANCE CARE PLANNING)
Advance Care Planning   ACP discussion was held with the patient during this visit. Patient has an advance directive (not in EMR), copy requested.

## 2023-10-25 ENCOUNTER — OFFICE VISIT (OUTPATIENT)
Dept: CARDIOLOGY | Facility: CLINIC | Age: 83
End: 2023-10-25
Payer: MEDICARE

## 2023-10-25 VITALS
HEIGHT: 66 IN | SYSTOLIC BLOOD PRESSURE: 138 MMHG | WEIGHT: 129.6 LBS | DIASTOLIC BLOOD PRESSURE: 76 MMHG | HEART RATE: 66 BPM | BODY MASS INDEX: 20.83 KG/M2

## 2023-10-25 DIAGNOSIS — I10 PRIMARY HYPERTENSION: ICD-10-CM

## 2023-10-25 DIAGNOSIS — E78.00 HYPERCHOLESTEREMIA: ICD-10-CM

## 2023-10-25 DIAGNOSIS — I35.0 NONRHEUMATIC AORTIC VALVE STENOSIS: Primary | ICD-10-CM

## 2023-10-25 DIAGNOSIS — R01.1 MURMUR, CARDIAC: ICD-10-CM

## 2023-10-25 DIAGNOSIS — E11.9 TYPE 2 DIABETES MELLITUS WITHOUT COMPLICATION, WITHOUT LONG-TERM CURRENT USE OF INSULIN: ICD-10-CM

## 2023-10-25 RX ORDER — SIMVASTATIN 40 MG
TABLET ORAL
Qty: 90 TABLET | Refills: 3 | Status: SHIPPED | OUTPATIENT
Start: 2023-10-25

## 2023-10-25 RX ORDER — LISINOPRIL 20 MG/1
20 TABLET ORAL DAILY
Qty: 90 TABLET | Refills: 2 | Status: SHIPPED | OUTPATIENT
Start: 2023-10-25

## 2023-10-25 NOTE — PROGRESS NOTES
Chief Complaint   Patient presents with    Follow-up     Pt is here for cardiac follow up.  Pt denies CP, SOB, dizziness or palpitations.  Pt does take a daily ASA.    Med Refill     Medications were verified by the pt.      Lab Work     Pt states their last labs were about a month ago with her PCP.         Cardiac Complaints  none      Subjective   Katheryn Adams is a 83 y.o. female with AS, DM, HTN, and hypothyroidism after thyroidectomy from thyroid cancer. She was referred in 2022 for cardiac evaluation. Echo ordered at consult showed EF of 55% with BOGDAN of 1.12, annual echo recommended, or sooner if symptoms arose.     She comes today for follow up and denies new concerns. No CP,SOA,dizziness, palpitations, or syncope noted. Labs with PCP, checked about a month ago, no current available. Refills requested.        Cardiac History  Past Surgical History:   Procedure Laterality Date    CARPAL TUNNEL RELEASE  2016    ECHO - CONVERTED  07/14/2022    EF 55%. LA- 4.1. BOGDAN- 1.12.27/51. Trace-Mild AI, Mild MR. RVSP- 27 mmHg    ORIF FEMUR FRACTURE  2019    SIMPLE MASTECTOMY  2014    THYROIDECTOMY, PARTIAL  2005       Current Outpatient Medications   Medication Sig Dispense Refill    aspirin 81 MG EC tablet Take 1 tablet by mouth Daily.      Biotin 5000 MCG tablet Take 1 tablet by mouth Daily.      Coenzyme Q10 200 MG capsule Take 200 mg by mouth Daily.      Cyanocobalamin (Vitamin B 12) 500 MCG tablet Take 1 tablet by mouth Daily.      levothyroxine (SYNTHROID, LEVOTHROID) 200 MCG tablet Take 1 tablet by mouth Daily. 90 tablet 3    lisinopril (PRINIVIL,ZESTRIL) 20 MG tablet Take 1 tablet by mouth Daily. 90 tablet 2    meloxicam (MOBIC) 15 MG tablet 1 tablet Daily.      metFORMIN ER (GLUCOPHAGE-XR) 500 MG 24 hr tablet Take 2 tablets by mouth 2 (Two) Times a Day. (Patient taking differently: Take 250 mg by mouth Daily.) 360 tablet 3    multivitamin with minerals tablet tablet Take 1 tablet by mouth Daily.      omeprazole  "(priLOSEC) 20 MG capsule Take 1 capsule by mouth Daily.      simvastatin (Zocor) 40 MG tablet Take 1 tablet daily 90 tablet 3     No current facility-administered medications for this visit.       Demerol [meperidine]    Past Medical History:   Diagnosis Date    Arthritis     Benign hypertension     Mixed hyperlipidemia     Postoperative hypothyroidism     Thyroid cancer     Type 2 diabetes mellitus, uncontrolled        Social History     Socioeconomic History    Marital status:    Tobacco Use    Smoking status: Never    Smokeless tobacco: Never   Vaping Use    Vaping Use: Never used   Substance and Sexual Activity    Alcohol use: Not Currently    Drug use: Defer    Sexual activity: Defer       Family History   Problem Relation Age of Onset    Hypertension Mother     Heart attack Mother     Arthritis Mother     Skin cancer Mother     Stroke Mother     COPD Father     Asthma Father     Stroke Son     Cancer Son     Stroke Son     Diabetes Son     No Known Problems Son     Diabetes Son     Kidney failure Son     Arthritis Son     Pancreatitis Son        Review of Systems   Constitutional: Negative for malaise/fatigue and night sweats.   Cardiovascular:  Negative for chest pain, claudication, dyspnea on exertion, irregular heartbeat, leg swelling, near-syncope, orthopnea, palpitations and syncope.   Respiratory:  Negative for cough, shortness of breath and wheezing.    Musculoskeletal:  Positive for stiffness. Negative for back pain and joint pain.   Gastrointestinal:  Negative for anorexia, heartburn, nausea and vomiting.   Genitourinary:  Negative for dysuria, hematuria, hesitancy and nocturia.   Neurological:  Negative for dizziness, light-headedness and loss of balance.   Psychiatric/Behavioral:  Negative for depression and substance abuse. The patient is not nervous/anxious.            Objective     /76 (BP Location: Left arm, Patient Position: Sitting)   Pulse 66   Ht 167.6 cm (66\")   Wt 58.8 kg " (129 lb 9.6 oz)   BMI 20.92 kg/m²     Constitutional:       Appearance: Not in distress.   Eyes:      Pupils: Pupils are equal, round, and reactive to light.   HENT:      Nose: Nose normal.   Pulmonary:      Effort: Pulmonary effort is normal.      Breath sounds: Normal breath sounds.   Cardiovascular:      PMI at left midclavicular line. Normal rate. Regular rhythm.      Murmurs: There is a systolic murmur.   Abdominal:      Palpations: Abdomen is soft.   Musculoskeletal: Normal range of motion.      Cervical back: Normal range of motion and neck supple. Skin:     General: Skin is warm and dry.   Neurological:      Mental Status: Alert.         Procedures         Diagnoses and all orders for this visit:    1. Nonrheumatic aortic valve stenosis (Primary)  -     Adult Transthoracic Echo Complete W/ Cont if Necessary Per Protocol; Future    2. Murmur, cardiac    3. Primary hypertension    4. Hypercholesteremia    5. Type 2 diabetes mellitus without complication, without long-term current use of insulin    Other orders  -     lisinopril (PRINIVIL,ZESTRIL) 20 MG tablet; Take 1 tablet by mouth Daily.  Dispense: 90 tablet; Refill: 2  -     simvastatin (Zocor) 40 MG tablet; Take 1 tablet daily  Dispense: 90 tablet; Refill: 3             AS: Noted as moderate/severe on echo from 2022 (BOGDAN 1.12cm2). Repeat echo urged to re-evaluate BOGDAN. ASA continued.     HTN: BP is stable. No adjustment to current lisinopril therapy advised. Limited sodium urged.      Hyperlipidemia: On statin therapy with zocor. Tolerates well. FLP with PCP. Limited carb diet urged.      Cardiac status stable, current advised.      DM: On glucophage therapy, AIC with your office. She states improved to 5% AIC on recent check.     Hypothyroid: Followed by specialty, on synthroid replacement.     BMI noted at 20.92, good cardiac diet with activity as tolerated urged.    6 month follow up urged, sooner if needed.          Problems Addressed this Visit           Cardiac and Vasculature    Primary hypertension    Relevant Medications    lisinopril (PRINIVIL,ZESTRIL) 20 MG tablet    Aortic valve stenosis - Primary    Relevant Orders    Adult Transthoracic Echo Complete W/ Cont if Necessary Per Protocol    Murmur, cardiac    Hypercholesteremia    Relevant Medications    simvastatin (Zocor) 40 MG tablet       Endocrine and Metabolic    DM (diabetes mellitus), type 2     Diagnoses         Codes Comments    Nonrheumatic aortic valve stenosis    -  Primary ICD-10-CM: I35.0  ICD-9-CM: 424.1     Murmur, cardiac     ICD-10-CM: R01.1  ICD-9-CM: 785.2     Primary hypertension     ICD-10-CM: I10  ICD-9-CM: 401.9     Hypercholesteremia     ICD-10-CM: E78.00  ICD-9-CM: 272.0     Type 2 diabetes mellitus without complication, without long-term current use of insulin     ICD-10-CM: E11.9  ICD-9-CM: 250.00             BMI is within normal parameters. No other follow-up for BMI required.              Electronically signed by SCOTTIE Reynolds October 25, 2023 09:57 EDT

## 2023-11-09 ENCOUNTER — HOSPITAL ENCOUNTER (OUTPATIENT)
Dept: CARDIOLOGY | Facility: HOSPITAL | Age: 83
Discharge: HOME | End: 2023-11-09
Payer: MEDICARE

## 2023-11-09 VITALS — BODY MASS INDEX: 20.83 KG/M2 | HEIGHT: 66 IN | WEIGHT: 129.63 LBS

## 2023-11-09 DIAGNOSIS — I35.0 NONRHEUMATIC AORTIC VALVE STENOSIS: ICD-10-CM

## 2023-11-09 LAB
AORTIC DIMENSIONLESS INDEX: 0.3 (DI)
BH CV ECHO MEAS - ACS: 0.99 CM
BH CV ECHO MEAS - AO MAX PG: 68.1 MMHG
BH CV ECHO MEAS - AO MEAN PG: 35.9 MMHG
BH CV ECHO MEAS - AO ROOT DIAM: 3 CM
BH CV ECHO MEAS - AO V2 MAX: 412.6 CM/SEC
BH CV ECHO MEAS - AO V2 VTI: 99.1 CM
BH CV ECHO MEAS - AVA(I,D): 1.12 CM2
BH CV ECHO MEAS - EDV(CUBED): 70.2 ML
BH CV ECHO MEAS - EF(MOD-BP): 66 %
BH CV ECHO MEAS - ESV(CUBED): 18.5 ML
BH CV ECHO MEAS - FS: 35.9 %
BH CV ECHO MEAS - IVS/LVPW: 0.99 CM
BH CV ECHO MEAS - IVSD: 1.09 CM
BH CV ECHO MEAS - LA DIMENSION: 4.1 CM
BH CV ECHO MEAS - LAT PEAK E' VEL: 7.4 CM/SEC
BH CV ECHO MEAS - LV MASS(C)D: 151.9 GRAMS
BH CV ECHO MEAS - LV MAX PG: 6.2 MMHG
BH CV ECHO MEAS - LV MEAN PG: 3.6 MMHG
BH CV ECHO MEAS - LV V1 MAX: 124.2 CM/SEC
BH CV ECHO MEAS - LV V1 VTI: 36.3 CM
BH CV ECHO MEAS - LVIDD: 4.1 CM
BH CV ECHO MEAS - LVIDS: 2.6 CM
BH CV ECHO MEAS - LVOT AREA: 3 CM2
BH CV ECHO MEAS - LVOT DIAM: 1.97 CM
BH CV ECHO MEAS - LVPWD: 1.1 CM
BH CV ECHO MEAS - MED PEAK E' VEL: 5.7 CM/SEC
BH CV ECHO MEAS - MV A MAX VEL: 122.2 CM/SEC
BH CV ECHO MEAS - MV DEC SLOPE: 262.8 CM/SEC2
BH CV ECHO MEAS - MV DEC TIME: 0.35 SEC
BH CV ECHO MEAS - MV E MAX VEL: 91.9 CM/SEC
BH CV ECHO MEAS - MV E/A: 0.75
BH CV ECHO MEAS - MV MAX PG: 8.6 MMHG
BH CV ECHO MEAS - MV MEAN PG: 3.3 MMHG
BH CV ECHO MEAS - MV P1/2T: 128.4 MSEC
BH CV ECHO MEAS - MV V2 VTI: 45.6 CM
BH CV ECHO MEAS - MVA(P1/2T): 1.71 CM2
BH CV ECHO MEAS - MVA(VTI): 2.43 CM2
BH CV ECHO MEAS - PA V2 MAX: 107.2 CM/SEC
BH CV ECHO MEAS - RAP SYSTOLE: 8 MMHG
BH CV ECHO MEAS - RV MAX PG: 3.5 MMHG
BH CV ECHO MEAS - RV V1 MAX: 93.9 CM/SEC
BH CV ECHO MEAS - RV V1 VTI: 21.2 CM
BH CV ECHO MEAS - RVSP: 33.6 MMHG
BH CV ECHO MEAS - SV(LVOT): 110.5 ML
BH CV ECHO MEAS - TAPSE (>1.6): 2.5 CM
BH CV ECHO MEAS - TR MAX PG: 25.6 MMHG
BH CV ECHO MEAS - TR MAX VEL: 253.1 CM/SEC
BH CV ECHO MEASUREMENTS AVERAGE E/E' RATIO: 14.03
BH CV XLRA - TDI S': 13 CM/SEC
SINUS: 2.7 CM

## 2023-11-09 PROCEDURE — 93306 TTE W/DOPPLER COMPLETE: CPT

## 2024-01-01 NOTE — PROGRESS NOTES
EF 61-65%, BOGDAN similar to prior. Please make sure no symptoms reported. Will repeat her echos every 6 months vs year. If symptoms should develop, cath will be warranted for accurate measurement of valve. Peak mean gradient still below 40. Event Note

## 2024-01-25 ENCOUNTER — OFFICE VISIT (OUTPATIENT)
Dept: ENDOCRINOLOGY | Facility: CLINIC | Age: 84
End: 2024-01-25
Payer: MEDICARE

## 2024-01-25 VITALS
BODY MASS INDEX: 20.57 KG/M2 | HEIGHT: 66 IN | DIASTOLIC BLOOD PRESSURE: 70 MMHG | SYSTOLIC BLOOD PRESSURE: 140 MMHG | OXYGEN SATURATION: 99 % | WEIGHT: 128 LBS | HEART RATE: 69 BPM

## 2024-01-25 DIAGNOSIS — I10 PRIMARY HYPERTENSION: ICD-10-CM

## 2024-01-25 DIAGNOSIS — C73 FOLLICULAR THYROID CANCER: ICD-10-CM

## 2024-01-25 DIAGNOSIS — E78.00 HYPERCHOLESTEREMIA: ICD-10-CM

## 2024-01-25 DIAGNOSIS — E11.65 TYPE 2 DIABETES MELLITUS WITH HYPERGLYCEMIA, WITHOUT LONG-TERM CURRENT USE OF INSULIN: Primary | ICD-10-CM

## 2024-01-25 DIAGNOSIS — E89.0 POSTOPERATIVE HYPOTHYROIDISM: ICD-10-CM

## 2024-01-25 LAB
EXPIRATION DATE: ABNORMAL
HBA1C MFR BLD: 5.9 % (ref 4.5–5.7)
Lab: ABNORMAL

## 2024-01-25 PROCEDURE — 3077F SYST BP >= 140 MM HG: CPT | Performed by: INTERNAL MEDICINE

## 2024-01-25 PROCEDURE — 83036 HEMOGLOBIN GLYCOSYLATED A1C: CPT | Performed by: INTERNAL MEDICINE

## 2024-01-25 PROCEDURE — 99214 OFFICE O/P EST MOD 30 MIN: CPT | Performed by: INTERNAL MEDICINE

## 2024-01-25 PROCEDURE — 3044F HG A1C LEVEL LT 7.0%: CPT | Performed by: INTERNAL MEDICINE

## 2024-01-25 PROCEDURE — 3078F DIAST BP <80 MM HG: CPT | Performed by: INTERNAL MEDICINE

## 2024-01-25 NOTE — PROGRESS NOTES
Office Note      Date: 2024  Patient Name: Katheryn Adams  MRN: [id number]  : 1940    Chief Complaint   Patient presents with    Diabetes       History of Present Illness:   She remains on metformin. She is tolerating this okay. She denies any hypoglycemia. She denies any sxs of hyperglycemia. She is doing FSBS 2-3x per week. She says FSBS have been . She is up to date on eye exam. She remains on ACE-I and statin. She is taking ASA daily.     She remains on T4 200mcg qd. She is taking this correctly. She isn't taking any interfering meds concurrently. She hasn't noted any change in the size of her neck. She denies any compressive sxs. She denies any sxs of hypo- or hyperthyroidism at this time.     Thyroid cancer - s/p partial thyroidectomy 05 with pathology showing follicular cancer; completion thyroidectomy; 100mCi of I131 3/05; negative whole body scan and undetectable TG 3/06 and 3/07 and  and 12/10; scan negative  - but TG was 2.3 - neck u/s was negative; negative neck u/s ; negative PET scan  (for f/u breast ca); negative neck u/s 10/15; TG 2.8 in ; negative neck u/s 2017; negative neck u/s 2018 with TG of 2.5; negative neck u/s and unstimulated TG 0.9 2019; partly stimulated TG of 25 in 2021; negative neck u/s 10/2021; TG 8.3 with negative TG ab 3/2022    It has been over a year since her last visit with us.      Subjective      Diabetic Complications:  Eyes: No  Kidneys: No  Feet: No  Heart: No    Diet and Exercise:  Meals per day: 3  Minutes of exercise per week: 0 mins.    Review of Systems:   Review of Systems   Constitutional: Negative.    Cardiovascular: Negative.    Gastrointestinal: Negative.    Endocrine: Negative.        The following portions of the patient's history were reviewed and updated as appropriate: allergies, current medications, past family history, past medical history, past social history, past surgical history, and problem  "list.    Objective     Visit Vitals  /70   Pulse 69   Ht 167.6 cm (66\")   Wt 58.1 kg (128 lb)   SpO2 99%   BMI 20.66 kg/m²       Physical Exam:  Physical Exam  Constitutional:       Appearance: Normal appearance.   Neurological:      Mental Status: She is alert.         Labs:    HbA1c  Lab Results   Component Value Date    HGBA1C 5.9 (A) 01/25/2024       CMP  Lab Results   Component Value Date    GLUCOSE 110 (H) 09/22/2022    BUN 21 09/22/2022    CREATININE 0.99 09/22/2022    BCR 21.2 09/22/2022    K 4.6 09/22/2022    CO2 23.5 09/22/2022    CALCIUM 10.3 09/22/2022    AST 15 09/22/2022    ALT 7 09/22/2022        Lipid Panel  Lab Results   Component Value Date    HDL 55 09/22/2022     (H) 09/22/2022    TRIG 102 09/22/2022        TSH  Lab Results   Component Value Date    TSH 0.051 (L) 09/22/2022    FREET4 1.47 09/22/2022        Hemoglobin A1C  Lab Results   Component Value Date    HGBA1C 5.9 (A) 01/25/2024        Microalbumin/Creatinine  Lab Results   Component Value Date    MALBCRERATIO  09/22/2022      Comment:      Unable to calculate    MICROALBUR <1.2 09/22/2022           Assessment / Plan      Assessment & Plan:  Diagnoses and all orders for this visit:    1. Type 2 diabetes mellitus with hyperglycemia, without long-term current use of insulin (Primary)  Assessment & Plan:  Diabetes is unchanged.   Continue current treatment regimen.  Diabetes will be reassessed in 6 months.    Orders:  -     POC Glycosylated Hemoglobin (Hb A1C)  -     Cancel: Comprehensive Metabolic Panel; Future  -     Cancel: Microalbumin / Creatinine Urine Ratio - Urine, Clean Catch; Future  -     Comprehensive Metabolic Panel; Future  -     Microalbumin / Creatinine Urine Ratio - Urine, Clean Catch; Future    2. Primary hypertension  Assessment & Plan:  Hypertension is unchanged.  Continue current treatment regimen.  Blood pressure will be reassessed at the next regular appointment.      3. Hypercholesteremia  Assessment & " Plan:  She reports lipids were okay last fall.  Continue statin.      4. Postoperative hypothyroidism  Assessment & Plan:  Continue T4 tx.  Check TFTs.  Lab slip provided for her to do labs at her PCP's office.  She will need Rx for levothyroxine sent in.    Orders:  -     Cancel: TSH; Future  -     Cancel: T4, Free; Future  -     T4, Free; Future  -     TSH; Future    5. Follicular thyroid cancer  Assessment & Plan:  Check TG levels.  We discussed doing another neck u/s.  Will schedule this in [place].    Orders:  -     Cancel: Thyroglobulin Antibody and Thyroglobulin, WILLIE or LC/MS-MS; Future  -     Thyroglobulin Antibody and Thyroglobulin, WILLIE or LC/MS-MS; Future  -     US Head Neck Soft Tissue; Future      Current Outpatient Medications   Medication Instructions    aspirin 81 mg, Oral, Daily    Biotin 5000 MCG tablet 1 tablet, Oral, Daily    Coenzyme Q10 200 mg, Oral, Daily    levothyroxine (SYNTHROID, LEVOTHROID) 200 mcg, Oral, Daily    lisinopril (PRINIVIL,ZESTRIL) 20 mg, Oral, Daily    meloxicam (MOBIC) 15 MG tablet 1 tablet, Daily    metFORMIN (GLUCOPHAGE) 250 mg, Oral, Daily With Breakfast    multivitamin with minerals tablet tablet 1 tablet, Oral, Daily    omeprazole (PRILOSEC) 20 mg, Oral, Daily    simvastatin (Zocor) 40 MG tablet Take 1 tablet daily    Vitamin B 12 500 mcg, Oral, Daily      Return in about 6 months (around 7/25/2024) for Recheck with A1c, TSH, free T4, foot exam.    Neri Elias MD   01/25/2024

## 2024-01-25 NOTE — ASSESSMENT & PLAN NOTE
Continue T4 tx.  Check TFTs.  Lab slip provided for her to do labs at her PCP's office.  She will need Rx for levothyroxine sent in.

## 2024-07-31 ENCOUNTER — OFFICE VISIT (OUTPATIENT)
Dept: CARDIOLOGY | Facility: CLINIC | Age: 84
End: 2024-07-31
Payer: MEDICARE

## 2024-07-31 VITALS
HEIGHT: 66 IN | DIASTOLIC BLOOD PRESSURE: 70 MMHG | HEART RATE: 74 BPM | OXYGEN SATURATION: 99 % | SYSTOLIC BLOOD PRESSURE: 132 MMHG | WEIGHT: 126.4 LBS | BODY MASS INDEX: 20.31 KG/M2

## 2024-07-31 DIAGNOSIS — E78.00 HYPERCHOLESTEREMIA: ICD-10-CM

## 2024-07-31 DIAGNOSIS — I10 PRIMARY HYPERTENSION: ICD-10-CM

## 2024-07-31 DIAGNOSIS — I35.0 AORTIC STENOSIS, MODERATE: Primary | ICD-10-CM

## 2024-07-31 DIAGNOSIS — R01.1 HEART MURMUR: ICD-10-CM

## 2024-07-31 DIAGNOSIS — E11.9 TYPE 2 DIABETES MELLITUS WITHOUT COMPLICATION, WITHOUT LONG-TERM CURRENT USE OF INSULIN: ICD-10-CM

## 2024-07-31 NOTE — PROGRESS NOTES
Chief Complaint   Patient presents with    Follow-up     Pt is here for cardiac follow up. Denies SOB, palpations/dizziness      Med Refill     Pt request 90 day refills to be sent to [facility] .  Medications were verified by med list.      lab work     Pt states their last labs were in the last few months with PCP.       Cardiac Complaints  none      Subjective   Katheryn Adams is a 84 y.o. female with AS, DM, HTN, and hypothyroidism after thyroidectomy from thyroid cancer. She was referred in 2022 for cardiac evaluation. Echo ordered at consult showed EF of 55% with BOGDAN of 1.12, annual echo recommended, or sooner if symptoms arose.     She comes today for follow up and denies new concerns. No CP, SOA, dizziness, palpitations, or syncope noted. Labs with PCP, checked a few months ago. Refills requested.        Cardiac History  Past Surgical History:   Procedure Laterality Date    CARPAL TUNNEL RELEASE  2016    ECHO - CONVERTED  07/14/2022    EF 55%. LA- 4.1. BOGDAN- 1.12.27/51. Trace-Mild AI, Mild MR. RVSP- 27 mmHg    ECHO - CONVERTED  11/09/2023    EF 65%. LA- 4.1. BOGDAN-1.1.38/68. MVA-1.71.Mild MR. RVSP- 34 mmHg    ORIF FEMUR FRACTURE  2019    SIMPLE MASTECTOMY  2014    THYROIDECTOMY, PARTIAL  2005       Current Outpatient Medications   Medication Sig Dispense Refill    aspirin 81 MG EC tablet Take 1 tablet by mouth Daily.      Biotin 5000 MCG tablet Take 1 tablet by mouth Daily.      Coenzyme Q10 200 MG capsule Take 200 mg by mouth Daily.      Cyanocobalamin (Vitamin B 12) 500 MCG tablet Take 1 tablet by mouth Daily.      levothyroxine (SYNTHROID, LEVOTHROID) 200 MCG tablet Take 1 tablet by mouth Daily. 90 tablet 3    lisinopril (PRINIVIL,ZESTRIL) 20 MG tablet Take 1 tablet by mouth Daily. 90 tablet 2    meloxicam (MOBIC) 15 MG tablet 1 tablet Daily.      multivitamin with minerals tablet tablet Take 1 tablet by mouth Daily.      omeprazole (priLOSEC) 20 MG capsule Take 1 capsule by mouth Daily.      simvastatin  "(Zocor) 40 MG tablet Take 1 tablet daily 90 tablet 3    metFORMIN (GLUCOPHAGE) 250 MG half tablet Take 1 half tablet by mouth Daily With Breakfast. (Patient not taking: Reported on 7/31/2024)       No current facility-administered medications for this visit.       Demerol [meperidine]    Past Medical History:   Diagnosis Date    Arthritis     Benign hypertension     Mixed hyperlipidemia     Postoperative hypothyroidism     Thyroid cancer     Type 2 diabetes mellitus, uncontrolled        Social History     Socioeconomic History    Marital status:    Tobacco Use    Smoking status: Never    Smokeless tobacco: Never   Vaping Use    Vaping status: Never Used   Substance and Sexual Activity    Alcohol use: Not Currently    Drug use: Defer    Sexual activity: Defer       Family History   Problem Relation Age of Onset    Hypertension Mother     Heart attack Mother     Arthritis Mother     Skin cancer Mother     Stroke Mother     COPD Father     Asthma Father     Stroke Son     Cancer Son     Stroke Son     Diabetes Son     No Known Problems Son     Diabetes Son     Kidney failure Son     Arthritis Son     Pancreatitis Son        Review of Systems   Constitutional: Negative for malaise/fatigue and night sweats.   Cardiovascular:  Negative for chest pain, claudication, dyspnea on exertion, irregular heartbeat, leg swelling, near-syncope, orthopnea, palpitations and syncope.   Respiratory:  Negative for cough, shortness of breath and wheezing.    Musculoskeletal:  Negative for back pain, joint pain and stiffness.   Gastrointestinal:  Negative for anorexia, heartburn, nausea and vomiting.   Genitourinary:  Negative for dysuria, hematuria, hesitancy and nocturia.   Neurological:  Negative for dizziness, headaches and light-headedness.   Psychiatric/Behavioral:  Negative for depression and memory loss. The patient is not nervous/anxious.            Objective     /70   Pulse 74   Ht 167.6 cm (66\")   Wt 57.3 kg (126 " lb 6.4 oz)   LMP  (LMP Unknown)   SpO2 99%   BMI 20.40 kg/m²     Constitutional:       Appearance: Not in distress.   Eyes:      Pupils: Pupils are equal, round, and reactive to light.   HENT:      Nose: Nose normal.   Pulmonary:      Effort: Pulmonary effort is normal.      Breath sounds: Normal breath sounds.   Cardiovascular:      PMI at left midclavicular line. Normal rate. Regular rhythm.      Murmurs: There is a systolic murmur.   Abdominal:      Palpations: Abdomen is soft.   Musculoskeletal: Normal range of motion.      Cervical back: Normal range of motion and neck supple. Skin:     General: Skin is warm and dry.   Neurological:      Mental Status: Alert.         Procedures         Diagnoses and all orders for this visit:    1. Aortic stenosis, moderate (Primary)  -     Cancel: Adult Transthoracic Echo Complete W/ Cont if Necessary Per Protocol; Future    2. Heart murmur  -     Cancel: Adult Transthoracic Echo Complete W/ Cont if Necessary Per Protocol; Future    3. Primary hypertension    4. Hypercholesteremia    5. Type 2 diabetes mellitus without complication, without long-term current use of insulin             AS: Noted as moderate/severe on echo from 2023 (BOGDAN 1.12cm2). Discussed repeat echo to re-evaluate BOGDAN, she wishes to wait till next visit. ASA continued.     HTN: BP is stable. No adjustment to current lisinopril therapy advised. Limited sodium urged.      Hyperlipidemia: On statin therapy with zocor. Tolerates well. FLP with PCP. Limited carb diet urged.      Cardiac status stable, current advised.      DM: Off metformin therapy, now taking just cinnamon therapy. AIC was done with your office, at 6.6% a few weeks ago.     Hypothyroid: Followed by specialty, on synthroid replacement. Palpitations denied.     BMI noted at 20.40, good cardiac diet with activity as tolerated urged.     6 month follow up urged, sooner if needed.           Problems Addressed this Visit          Cardiac and  Vasculature    Primary hypertension    Hypercholesteremia       Endocrine and Metabolic    Type 2 diabetes mellitus with hyperglycemia, without long-term current use of insulin     Other Visit Diagnoses       Aortic stenosis, moderate    -  Primary    Heart murmur              Diagnoses         Codes Comments    Aortic stenosis, moderate    -  Primary ICD-10-CM: I35.0  ICD-9-CM: 424.1     Heart murmur     ICD-10-CM: R01.1  ICD-9-CM: 785.2     Primary hypertension     ICD-10-CM: I10  ICD-9-CM: 401.9     Hypercholesteremia     ICD-10-CM: E78.00  ICD-9-CM: 272.0     Type 2 diabetes mellitus without complication, without long-term current use of insulin     ICD-10-CM: E11.9  ICD-9-CM: 250.00             BMI is within normal parameters. No other follow-up for BMI required.              Electronically signed by SCOTTIE Reynolds July 31, 2024 12:55 EDT

## 2025-05-06 ENCOUNTER — OFFICE VISIT (OUTPATIENT)
Dept: ENDOCRINOLOGY | Facility: CLINIC | Age: 85
End: 2025-05-06
Payer: MEDICARE

## 2025-05-06 VITALS
SYSTOLIC BLOOD PRESSURE: 124 MMHG | DIASTOLIC BLOOD PRESSURE: 52 MMHG | WEIGHT: 123.4 LBS | OXYGEN SATURATION: 97 % | BODY MASS INDEX: 19.92 KG/M2 | HEART RATE: 77 BPM

## 2025-05-06 DIAGNOSIS — E11.9 TYPE 2 DIABETES MELLITUS WITHOUT COMPLICATION, WITHOUT LONG-TERM CURRENT USE OF INSULIN: Primary | ICD-10-CM

## 2025-05-06 DIAGNOSIS — E78.00 HYPERCHOLESTEREMIA: ICD-10-CM

## 2025-05-06 DIAGNOSIS — C73 FOLLICULAR THYROID CANCER: ICD-10-CM

## 2025-05-06 DIAGNOSIS — I10 PRIMARY HYPERTENSION: ICD-10-CM

## 2025-05-06 DIAGNOSIS — E89.0 POSTOPERATIVE HYPOTHYROIDISM: ICD-10-CM

## 2025-05-06 PROBLEM — E11.65 TYPE 2 DIABETES MELLITUS WITH HYPERGLYCEMIA, WITHOUT LONG-TERM CURRENT USE OF INSULIN: Status: RESOLVED | Noted: 2021-06-03 | Resolved: 2025-05-06

## 2025-05-06 LAB
EXPIRATION DATE: NORMAL
EXPIRATION DATE: NORMAL
GLUCOSE BLDC GLUCOMTR-MCNC: 126 MG/DL (ref 70–130)
HBA1C MFR BLD: 5.6 % (ref 4.5–5.7)
Lab: NORMAL
Lab: NORMAL

## 2025-05-06 PROCEDURE — 84443 ASSAY THYROID STIM HORMONE: CPT | Performed by: NURSE PRACTITIONER

## 2025-05-06 PROCEDURE — 84439 ASSAY OF FREE THYROXINE: CPT | Performed by: NURSE PRACTITIONER

## 2025-05-06 PROCEDURE — 84432 ASSAY OF THYROGLOBULIN: CPT | Performed by: NURSE PRACTITIONER

## 2025-05-06 PROCEDURE — 86800 THYROGLOBULIN ANTIBODY: CPT | Performed by: NURSE PRACTITIONER

## 2025-05-06 NOTE — PROGRESS NOTES
Chief Complaint   Patient presents with    Diabetes     F/u        Referring Provider  No ref. provider found     HPI   Katheryn Adams is a 84 y.o. female had concerns including Diabetes (F/u).    T2DM, HTN, Hypercholesterolemia, Postoperative Hypothyroidism, Follicular Thyroid Carcinoma.     Diabetes:  She has been on Metformin in the past, but has not been taking it for sometime.  She denies any s/sx's of hyper/hypoglycemia and regulated values when she checks her BG.      Hypertension:  Lisinopril 20 mg QD. She is taking this regularly without missing doses.     Hypercholesterolemia:  Simvastatin 40 mg 3 times per week.  She is taking this regularly without missing doses. She is tolerating it without any adverse effects.     Thyroid Cancer:  s/p partial thyroidectomy 1/31/05 with pathology showing follicular cancer; completion thyroidectomy; 100mCi of I131 3/05; negative whole body scan and undetectable TG 3/06 and 3/07 and 4/09 and 12/10; scan negative 11/12 - but TG was 2.3 - neck u/s was negative; negative neck u/s 4/14; negative PET scan 12/14 (for f/u breast ca); negative neck u/s 10/15; TG 2.8 in 1/17; negative neck u/s 5/2017; negative neck u/s 7/2018 with TG of 2.5; negative neck u/s and unstimulated TG 0.9 11/2019; partly stimulated TG of 25 in 6/2021; negative neck u/s 10/2021; TG 8.3 with negative TG ab 3/2022. Was scheduled for US Thyroid in 2024, but her son passed away and did not get completed.  Has not had any imaging since 2021. Denies any changes to her neck or compressive s/sx's.    Postoperative Hypothyroidism:  She is currently taking T4 200 mcg QD. She is given this regularly without missing doses.  She denies any conflicting medication.    The following portions of the patient's history were reviewed and updated as appropriate: allergies, current medications, past family history, past medical history, past social history, past surgical history, and problem list.    Past Medical History:    Diagnosis Date    Arthritis     Benign hypertension     Mixed hyperlipidemia     Postoperative hypothyroidism     Thyroid cancer     Type 2 diabetes mellitus, uncontrolled      Past Surgical History:   Procedure Laterality Date    CARPAL TUNNEL RELEASE  2016    ECHO - CONVERTED  07/14/2022    EF 55%. LA- 4.1. BOGDAN- 1.12.27/51. Trace-Mild AI, Mild MR. RVSP- 27 mmHg    ECHO - CONVERTED  11/09/2023    EF 65%. LA- 4.1. BOGDAN-1.1.38/68. MVA-1.71.Mild MR. RVSP- 34 mmHg    ORIF FEMUR FRACTURE  2019    SIMPLE MASTECTOMY  2014    THYROIDECTOMY, PARTIAL  2005      Family History   Problem Relation Age of Onset    Hypertension Mother     Heart attack Mother     Arthritis Mother     Skin cancer Mother     Stroke Mother     COPD Father     Asthma Father     Stroke Son     Cancer Son     Stroke Son     Diabetes Son     No Known Problems Son     Diabetes Son     Kidney failure Son     Arthritis Son     Pancreatitis Son       Social History     Socioeconomic History    Marital status:    Tobacco Use    Smoking status: Never     Passive exposure: Never    Smokeless tobacco: Never   Vaping Use    Vaping status: Never Used   Substance and Sexual Activity    Alcohol use: Never    Drug use: Never    Sexual activity: Defer      Allergies   Allergen Reactions    Demerol [Meperidine] Anaphylaxis      Current Outpatient Medications on File Prior to Visit   Medication Sig Dispense Refill    aspirin 81 MG EC tablet Take 1 tablet by mouth Daily.      Biotin 5000 MCG tablet Take 1 tablet by mouth Daily.      Coenzyme Q10 200 MG capsule Take 200 mg by mouth Daily.      Cyanocobalamin (Vitamin B 12) 500 MCG tablet Take 1 tablet by mouth Daily.      levothyroxine (SYNTHROID, LEVOTHROID) 200 MCG tablet Take 1 tablet by mouth Daily. 90 tablet 3    lisinopril (PRINIVIL,ZESTRIL) 20 MG tablet Take 1 tablet by mouth Daily. 90 tablet 2    meloxicam (MOBIC) 15 MG tablet 1 tablet Daily.      multivitamin with minerals tablet tablet Take 1 tablet by  mouth Daily.      omeprazole (priLOSEC) 20 MG capsule Take 1 capsule by mouth Daily.      simvastatin (Zocor) 40 MG tablet Take 1 tablet daily 90 tablet 3    metFORMIN (GLUCOPHAGE) 250 MG half tablet Take 1 half tablet by mouth Daily With Breakfast. (Patient not taking: Reported on 5/6/2025)       No current facility-administered medications on file prior to visit.        Review of Systems   Constitutional: Negative.    Eyes: Negative.    Endocrine: Negative.    Psychiatric/Behavioral: Negative.     All other systems reviewed and are negative.       /52 (BP Location: Right arm, Patient Position: Sitting, Cuff Size: Adult)   Pulse 77   Wt 56 kg (123 lb 6.4 oz)   SpO2 97%   BMI 19.92 kg/m²      Physical Exam  Vitals reviewed.   Constitutional:       Appearance: Normal appearance.   Eyes:      Extraocular Movements: Extraocular movements intact.   Neck:        Comments: No palpable thyroid tissue.  Cardiovascular:      Rate and Rhythm: Normal rate.   Pulmonary:      Effort: Pulmonary effort is normal.   Feet:      Right foot:      Skin integrity: Skin integrity normal.      Left foot:      Skin integrity: Skin integrity normal.   Skin:     Findings: No abrasion or wound.   Neurological:      General: No focal deficit present.      Mental Status: She is alert and oriented to person, place, and time.   Psychiatric:         Mood and Affect: Mood normal.         Behavior: Behavior normal.         Thought Content: Thought content normal.         Judgment: Judgment normal.       CMP:  Lab Results   Component Value Date    BUN 21 09/22/2022    CREATININE 0.99 09/22/2022    BCR 21.2 09/22/2022     09/22/2022    K 4.6 09/22/2022    CO2 23.5 09/22/2022    CALCIUM 10.3 09/22/2022    ALBUMIN 4.71 09/22/2022    AGRATIO 1.6 09/22/2022    BILITOT 0.2 09/22/2022    ALKPHOS 75 09/22/2022    AST 15 09/22/2022    ALT 7 09/22/2022     Lipid Panel:  Lab Results   Component Value Date    CHOL 185 09/22/2022    TRIG 102  09/22/2022    HDL 55 09/22/2022    VLDL 18 09/22/2022     (H) 09/22/2022     HbA1c:  Lab Results   Component Value Date    HGBA1C 5.6 05/06/2025      Glucose:  Lab Results   Component Value Date    POCGLU 126 05/06/2025     Microalbumin:  Lab Results   Component Value Date    MALBCRERATIO  09/22/2022      Comment:      Unable to calculate     TSH:  Lab Results   Component Value Date    TSH 0.051 (L) 09/22/2022       Assessment and Plan    Diagnoses and all orders for this visit:    1. Type 2 diabetes mellitus without complication, without long-term current use of insulin (Primary)  Assessment & Plan:  -Diabetes is at goal with A1c 5.6.  -Discussed dietary and exercise guidelines with patient.  -Discussed the importance of yearly eye exams.  -Discussed the importance of checking BG's regularly.    -Continue without use of medication.   -Discussed long term risks of untreated/undertreated diabetes including retinopathy, neuropathy, nephropathy, amputations, hospitalizations, MI, CVA.  -Discussed Glucagon use and appropriate timing for this.   -S/S hypoglycemia reviewed with Rule of 15's advised.  -Follow-up in 1 year.    Orders:  -     POC Glycosylated Hemoglobin (Hb A1C)  -     POC Glucose, Blood    2. Follicular thyroid cancer  Assessment & Plan:  Will obtain TG and TG antibody levels today.  Will treat and follow as indicated. Will obtain US Thyroid as she has not had one since 2021.    Orders:  -     US Thyroid; Future  -     Thyroglobulin  -     Thyroglobulin Antibody    3. Postoperative hypothyroidism  Assessment & Plan:  -Clinically euthyroid.  -TFT's today with medication adjustment accordingly.  -Reminded of proper administration including taking 7 pills, once daily, per week on an empty stomach with no missed doses, waiting 30-60 minutes prior to other medications or food.  -Follow-up in 1 year.    Orders:  -     TSH  -     T4, free    4. Hypercholesteremia  Assessment & Plan:  Lipids were stable on  last labs.  Continue with current dose of Simvastatin.        5. Primary hypertension  Assessment & Plan:  Hypertension is stable and controlled  Continue current treatment regimen.  Blood pressure will be reassessed in 1 year.           Return in about 1 year (around 5/6/2026) for Follow-up appointment. The patient was instructed to contact the clinic with any interval questions or concerns.        This document has been electronically signed by SCOTTIE Sellers  May 6, 2025 14:33 EDT   Endocrinology    Please note that portions of this document were completed with a voice recognition program. Efforts were made to edit the dictations, but occasionally words are mis-transcribed.

## 2025-05-06 NOTE — ASSESSMENT & PLAN NOTE
-Clinically euthyroid.  -TFT's today with medication adjustment accordingly.  -Reminded of proper administration including taking 7 pills, once daily, per week on an empty stomach with no missed doses, waiting 30-60 minutes prior to other medications or food.  -Follow-up in 1 year.

## 2025-05-06 NOTE — ASSESSMENT & PLAN NOTE
-Diabetes is at goal with A1c 5.6.  -Discussed dietary and exercise guidelines with patient.  -Discussed the importance of yearly eye exams.  -Discussed the importance of checking BG's regularly.    -Continue without use of medication.   -Discussed long term risks of untreated/undertreated diabetes including retinopathy, neuropathy, nephropathy, amputations, hospitalizations, MI, CVA.  -Discussed Glucagon use and appropriate timing for this.   -S/S hypoglycemia reviewed with Rule of 15's advised.  -Follow-up in 1 year.

## 2025-05-06 NOTE — ASSESSMENT & PLAN NOTE
Will obtain TG and TG antibody levels today.  Will treat and follow as indicated. Will obtain US Thyroid as she has not had one since 2021.

## 2025-05-07 LAB
T4 FREE SERPL-MCNC: 2.8 NG/DL (ref 0.92–1.68)
THYROGLOB AB SERPL-ACNC: <1 IU/ML (ref 0–0.9)
TSH SERPL DL<=0.05 MIU/L-ACNC: 0.01 UIU/ML (ref 0.27–4.2)

## 2025-05-13 LAB — THYROGLOB SERPL-MCNC: 44 NG/ML
